# Patient Record
Sex: FEMALE | Race: WHITE | NOT HISPANIC OR LATINO
[De-identification: names, ages, dates, MRNs, and addresses within clinical notes are randomized per-mention and may not be internally consistent; named-entity substitution may affect disease eponyms.]

---

## 2017-12-05 ENCOUNTER — RESULT REVIEW (OUTPATIENT)
Age: 57
End: 2017-12-05

## 2018-07-03 ENCOUNTER — INPATIENT (INPATIENT)
Facility: HOSPITAL | Age: 58
LOS: 1 days | Discharge: HOME | DRG: 313 | End: 2018-07-05
Attending: INTERNAL MEDICINE | Admitting: GENERAL ACUTE CARE HOSPITAL
Payer: MEDICAID

## 2018-07-03 VITALS — HEIGHT: 64 IN | WEIGHT: 141.98 LBS

## 2018-07-03 DIAGNOSIS — Z98.89 OTHER SPECIFIED POSTPROCEDURAL STATES: Chronic | ICD-10-CM

## 2018-07-03 LAB
ALBUMIN SERPL ELPH-MCNC: 4 G/DL — SIGNIFICANT CHANGE UP (ref 3.3–5.2)
ALP SERPL-CCNC: 63 U/L — SIGNIFICANT CHANGE UP (ref 40–120)
ALT FLD-CCNC: 24 U/L — SIGNIFICANT CHANGE UP
ANION GAP SERPL CALC-SCNC: 13 MMOL/L — SIGNIFICANT CHANGE UP (ref 5–17)
APTT BLD: 32 SEC — SIGNIFICANT CHANGE UP (ref 27.5–37.4)
AST SERPL-CCNC: 22 U/L — SIGNIFICANT CHANGE UP
BASOPHILS # BLD AUTO: 0 K/UL — SIGNIFICANT CHANGE UP (ref 0–0.2)
BASOPHILS NFR BLD AUTO: 0.2 % — SIGNIFICANT CHANGE UP (ref 0–2)
BILIRUB SERPL-MCNC: <0.2 MG/DL — LOW (ref 0.4–2)
BUN SERPL-MCNC: 19 MG/DL — SIGNIFICANT CHANGE UP (ref 8–20)
CALCIUM SERPL-MCNC: 9 MG/DL — SIGNIFICANT CHANGE UP (ref 8.6–10.2)
CHLORIDE SERPL-SCNC: 104 MMOL/L — SIGNIFICANT CHANGE UP (ref 98–107)
CK MB CFR SERPL CALC: 2.2 NG/ML — SIGNIFICANT CHANGE UP (ref 0–6.7)
CK SERPL-CCNC: 153 U/L — SIGNIFICANT CHANGE UP (ref 25–170)
CO2 SERPL-SCNC: 25 MMOL/L — SIGNIFICANT CHANGE UP (ref 22–29)
CREAT SERPL-MCNC: 0.95 MG/DL — SIGNIFICANT CHANGE UP (ref 0.5–1.3)
EOSINOPHIL # BLD AUTO: 0.2 K/UL — SIGNIFICANT CHANGE UP (ref 0–0.5)
EOSINOPHIL NFR BLD AUTO: 3.7 % — SIGNIFICANT CHANGE UP (ref 0–6)
GLUCOSE SERPL-MCNC: 96 MG/DL — SIGNIFICANT CHANGE UP (ref 70–115)
HCT VFR BLD CALC: 39.3 % — SIGNIFICANT CHANGE UP (ref 37–47)
HGB BLD-MCNC: 12.8 G/DL — SIGNIFICANT CHANGE UP (ref 12–16)
INR BLD: 0.89 RATIO — SIGNIFICANT CHANGE UP (ref 0.88–1.16)
LYMPHOCYTES # BLD AUTO: 2.4 K/UL — SIGNIFICANT CHANGE UP (ref 1–4.8)
LYMPHOCYTES # BLD AUTO: 37.7 % — SIGNIFICANT CHANGE UP (ref 20–55)
MCHC RBC-ENTMCNC: 29.4 PG — SIGNIFICANT CHANGE UP (ref 27–31)
MCHC RBC-ENTMCNC: 32.6 G/DL — SIGNIFICANT CHANGE UP (ref 32–36)
MCV RBC AUTO: 90.3 FL — SIGNIFICANT CHANGE UP (ref 81–99)
MONOCYTES # BLD AUTO: 0.5 K/UL — SIGNIFICANT CHANGE UP (ref 0–0.8)
MONOCYTES NFR BLD AUTO: 8 % — SIGNIFICANT CHANGE UP (ref 3–10)
NEUTROPHILS # BLD AUTO: 3.3 K/UL — SIGNIFICANT CHANGE UP (ref 1.8–8)
NEUTROPHILS NFR BLD AUTO: 50.2 % — SIGNIFICANT CHANGE UP (ref 37–73)
PLATELET # BLD AUTO: 265 K/UL — SIGNIFICANT CHANGE UP (ref 150–400)
POTASSIUM SERPL-MCNC: 3.7 MMOL/L — SIGNIFICANT CHANGE UP (ref 3.5–5.3)
POTASSIUM SERPL-SCNC: 3.7 MMOL/L — SIGNIFICANT CHANGE UP (ref 3.5–5.3)
PROT SERPL-MCNC: 6.7 G/DL — SIGNIFICANT CHANGE UP (ref 6.6–8.7)
PROTHROM AB SERPL-ACNC: 9.8 SEC — SIGNIFICANT CHANGE UP (ref 9.8–12.7)
RBC # BLD: 4.35 M/UL — LOW (ref 4.4–5.2)
RBC # FLD: 12.2 % — SIGNIFICANT CHANGE UP (ref 11–15.6)
SODIUM SERPL-SCNC: 142 MMOL/L — SIGNIFICANT CHANGE UP (ref 135–145)
TROPONIN T SERPL-MCNC: <0.01 NG/ML — SIGNIFICANT CHANGE UP (ref 0–0.06)
WBC # BLD: 6.5 K/UL — SIGNIFICANT CHANGE UP (ref 4.8–10.8)
WBC # FLD AUTO: 6.5 K/UL — SIGNIFICANT CHANGE UP (ref 4.8–10.8)

## 2018-07-03 PROCEDURE — 99218: CPT

## 2018-07-03 NOTE — ED PROVIDER NOTE - TIMING
sudden onset generalized weakness, fatigue, redness, drainage from surgical sites on your belly, increase pain. Go to the nearest emergency room for chest pain, shortness of breath, calf pain and swelling.

## 2018-07-03 NOTE — ED CDU PROVIDER INITIAL DAY NOTE - OBJECTIVE STATEMENT
56 yo female pmh asthma comes to ed with chest pain which radiated to back  and left arm  2 days ago;

## 2018-07-03 NOTE — ED ADULT NURSE NOTE - CHPI ED SYMPTOMS NEG
no syncope/no diaphoresis/no nausea/no vomiting/no fever/no shortness of breath/no chills/no back pain/no cough

## 2018-07-03 NOTE — ED STATDOCS - PROGRESS NOTE DETAILS
USOH until about two days ago, noted lightheadedness, not vertigo, without slurred speech/focal weakness, seen by her PMD yesterday, referred to Cardiology today due to intermittent chest tightness associated with LUE tingling/"funny sensation", sent to ED by her Cardiologist--Dr. Walton due to abnormal ECG.  Protocol orders entered, patient placed in MAIN AREA of ED on Monitor for a complete evaluation by another provider.

## 2018-07-03 NOTE — ED ADULT TRIAGE NOTE - CHIEF COMPLAINT QUOTE
"I was having chest pain, I had an EKG yesterday and the cardiologist saw it today and told me to get here right away. " Pt denies chest pain but states she feels dizzy and has a weird feeling in her arm. Pt A & OX4.

## 2018-07-03 NOTE — ED ADULT NURSE NOTE - OBJECTIVE STATEMENT
cp few days ago, dizziness since yesterday, pt reports minor dizziness at time of assessment w/o any other complaints. sent in from cardiologist office for abnormal ekg. a and o x3. breathing even and unlabored.  sitting calm in bed. will continue to monitor.

## 2018-07-03 NOTE — ED PROVIDER NOTE - OBJECTIVE STATEMENT
58 yo female pmh asthma comes to ed with chest pain which radiated to back  and left arm  2 days ago;

## 2018-07-04 DIAGNOSIS — R07.9 CHEST PAIN, UNSPECIFIED: ICD-10-CM

## 2018-07-04 LAB
TROPONIN T SERPL-MCNC: <0.01 NG/ML — SIGNIFICANT CHANGE UP (ref 0–0.06)
TROPONIN T SERPL-MCNC: <0.01 NG/ML — SIGNIFICANT CHANGE UP (ref 0–0.06)

## 2018-07-04 PROCEDURE — 99223 1ST HOSP IP/OBS HIGH 75: CPT

## 2018-07-04 PROCEDURE — 93010 ELECTROCARDIOGRAM REPORT: CPT

## 2018-07-04 PROCEDURE — 93880 EXTRACRANIAL BILAT STUDY: CPT | Mod: 26

## 2018-07-04 PROCEDURE — 93010 ELECTROCARDIOGRAM REPORT: CPT | Mod: 77

## 2018-07-04 PROCEDURE — 99217: CPT

## 2018-07-04 RX ORDER — METOPROLOL TARTRATE 50 MG
25 TABLET ORAL
Qty: 0 | Refills: 0 | Status: DISCONTINUED | OUTPATIENT
Start: 2018-07-05 | End: 2018-07-05

## 2018-07-04 RX ORDER — ASPIRIN/CALCIUM CARB/MAGNESIUM 324 MG
81 TABLET ORAL DAILY
Qty: 0 | Refills: 0 | Status: DISCONTINUED | OUTPATIENT
Start: 2018-07-04 | End: 2018-07-05

## 2018-07-04 RX ORDER — LANOLIN ALCOHOL/MO/W.PET/CERES
5 CREAM (GRAM) TOPICAL AT BEDTIME
Qty: 0 | Refills: 0 | Status: DISCONTINUED | OUTPATIENT
Start: 2018-07-04 | End: 2018-07-05

## 2018-07-04 RX ORDER — ALBUTEROL 90 UG/1
2 AEROSOL, METERED ORAL EVERY 6 HOURS
Qty: 0 | Refills: 0 | Status: DISCONTINUED | OUTPATIENT
Start: 2018-07-04 | End: 2018-07-05

## 2018-07-04 RX ORDER — SODIUM CHLORIDE 9 MG/ML
1000 INJECTION INTRAMUSCULAR; INTRAVENOUS; SUBCUTANEOUS
Qty: 0 | Refills: 0 | Status: COMPLETED | OUTPATIENT
Start: 2018-07-04 | End: 2018-07-04

## 2018-07-04 RX ORDER — ENOXAPARIN SODIUM 100 MG/ML
40 INJECTION SUBCUTANEOUS DAILY
Qty: 0 | Refills: 0 | Status: DISCONTINUED | OUTPATIENT
Start: 2018-07-05 | End: 2018-07-05

## 2018-07-04 RX ORDER — CALCIUM CARBONATE 500(1250)
1 TABLET ORAL THREE TIMES A DAY
Qty: 0 | Refills: 0 | Status: DISCONTINUED | OUTPATIENT
Start: 2018-07-04 | End: 2018-07-05

## 2018-07-04 RX ORDER — BUDESONIDE AND FORMOTEROL FUMARATE DIHYDRATE 160; 4.5 UG/1; UG/1
2 AEROSOL RESPIRATORY (INHALATION)
Qty: 0 | Refills: 0 | Status: DISCONTINUED | OUTPATIENT
Start: 2018-07-04 | End: 2018-07-05

## 2018-07-04 RX ADMIN — SODIUM CHLORIDE 75 MILLILITER(S): 9 INJECTION INTRAMUSCULAR; INTRAVENOUS; SUBCUTANEOUS at 16:34

## 2018-07-04 RX ADMIN — Medication 1 TABLET(S): at 23:28

## 2018-07-04 RX ADMIN — Medication 81 MILLIGRAM(S): at 16:34

## 2018-07-04 RX ADMIN — BUDESONIDE AND FORMOTEROL FUMARATE DIHYDRATE 2 PUFF(S): 160; 4.5 AEROSOL RESPIRATORY (INHALATION) at 20:00

## 2018-07-04 NOTE — H&P ADULT - ASSESSMENT
Patient is a 57 year old female with PMH of Vertigo, Asthma, and Insomnia who was sent to the ED from her cardiologist's office due to chest pain and abnormal EKG for a cardiac work up.     1. Chest Pain, rule out ACS  -admit to telemetry   -currently chest pain free  -outpatient EKG with possible anterolateral infarct   -TNI negative x 2  -serial TNI and EKG  -obtain TTE  -check lipid panel and HbA1c for risk stratification  -cardiac CTA in AM  -start ASA 81 mg daily and low dose metoprolol    2. Asthma  -respiratory status stable on RA  -ventolin PRN  -Symbicort BID  -CXR clear    3. Insomnia   -continue melatonin PRN    4. History of Vertigo  -long-standing history with intermittent dizziness which is triggered by fatigue  -tilt table test years ago  -declined meclizine while inpatient  -obtain TTE and carotid duplex    DVT prophylaxis - Lovenox SC

## 2018-07-04 NOTE — H&P ADULT - FAMILY HISTORY
Child  Still living? Unknown  Family history of transposition of great vessels, Age at diagnosis: Age Unknown

## 2018-07-04 NOTE — CONSULT NOTE ADULT - ASSESSMENT
58 y/o F with asthma, sent in by outpatient cardiologist (Dr. Rodriguez) for abnormal ECG and chest pain.    She reports 1 year history of progressive dyspnea, fatigue, decreased exercise tolerance.  Four days ago had acute onset midsternal chest tightness, radiating to the left shoulder, left axilla, associated with left arm tightness/paresthesias, lasted for < 1 minute, occurring at rest.  She was supposed to have a nuclear stress test due to the progressive dyspnea/fatigue, however have not gotten to it due to recurrent bronchitis.  ECG shows poor R wave progression.  Cardiac enzymes are negative.  CXR normal.    # angina - currently chest pain free  # abnormal ECG - possible anteroseptal infarct, no available priors to compare  # asthma - stable, no acute issues    - cardiac CTA  - metoprolol 25 mg  bid  - agree with TTE, lipid panel, hba1c for additional risk stratification  - monitor on telemetry  - Thank you for allowing me to participate in care of your patient.     D/W patient  and Dr. Radha Rodriguez to resume care tomorrow

## 2018-07-04 NOTE — ED ADULT NURSE REASSESSMENT NOTE - NS ED NURSE REASSESS COMMENT FT1
Pt c/o indigestion and requesting to take tums, as per MD West "OK", EKG ordered and to be completed, denies SOB, denies N/V, denies chest pain, showing NSR on monitor, pt awaiting CTA Cardiac in AM, will continue to monitor
Pt reports NPO since 10pm last night but did have a few sips of water this morning approx 0600 today. Pt verbalized understanding of not eating or drinking until after CT. Will continue to monitor.
Report received from outgoing RN's Kylie at change of shift and assumed care of pt at that time. Pt received on stretcher in no acute distress, awake, alert and oriented x 4, no acute distress, vss, fall precautions in place, denies pain, offers no complaints at this time, await MD orders, will continue to monitor.
Assuming care from previous RN, pt A&O x's 4, resp even and unlabored, LS clear and equal B/L, denies chest pain, denies SOB, denies nausea, c/o slight dizziness and weakness, pt aware of CTA and plan of care, placed on monitor showing sinus angel, will continue to monitor
Report received from outgoing RN's Kylie at change of shift and assumed care of pt at that time. Pt received on stretcher in no acute distress, awake, alert and oriented x 4, no acute distress, vss, fall precautions in place, denies pain, offers no complaints at this time, await CT, will continue to monitor.

## 2018-07-04 NOTE — CONSULT NOTE ADULT - SUBJECTIVE AND OBJECTIVE BOX
[facility]                                                        Office: [address] [place] [address]                                                       Telephone: [phone]. Fax:[phone]    Patient is a 57y old  Female who presents with a chief complaint of chest pain and fatigue    HPI: 58 y/o F with asthma, sent in by outpatient cardiologist (Dr. Rodriguez) for abnormal ECG and chest pain.    She reports 1 year history of progressive dyspnea, fatigue, decreased exercise tolerance.  Four days ago had acute onset midsternal chest tightness, radiating to the left shoulder, left axilla, associated with left arm tightness/paresthesias, lasted for < 1 minute, occurring at rest.  She was supposed to have a nuclear stress test due to the progressive dyspnea/fatigue, however have not gotten to it due to recurrent bronchitis.  ECG shows poor R wave progression.  Cardiac enzymes are negative.  Many years ago she reports having a positive tilt table test.       PAST MEDICAL & SURGICAL HISTORY:  asthma  No significant past surgical history    Allergies  No Known Allergies  Intolerances    Home Medications:  Proventil:  (04 Jul 2018 11:34)  symbicort      MEDICATIONS  (STANDING):  sodium chloride 0.9%. 1000 milliLiter(s) (75 mL/Hr) IV Continuous <Continuous>    MEDICATIONS  (PRN):      FAMILY HISTORY: son with TGA, no known premature cad, no sudden cardiac death.     SOCIAL HISTORY: No tobacco/ No etoh/ No illicit drug use    PREVIOUS DIAGNOSTIC TESTING:  NONE IN OUR SYSTEM    REVIEW OF SYSTEMS:  CONSTITUTIONAL: [-]fever   [-] weight loss   [+] fatigue  EYES: [-]  eye pain   [-] visual disturbances      [-]  discharge  ENMT:  [-]  difficulty hearing,   [-]  tinnitus   [-] vertigo    [-]  sinus or throat pain  NECK: [-]  pain or stiffness  RESPIRATORY: [-]  cough 	[-] wheezing 	[-]  hemoptysis 		[+]   Shortness of Breath  CARDIOVASCULAR: [+]  chest pain	[-] palpitations		[-]  passing out 		[-] dizziness 	[-]  leg swelling  		[-]  PND 	[-] orthopnea  GASTROINTESTINAL: [-]  abdominal pain		[-]nausea	[-] vomiting	[-]  hematemesis 	[-]  diarrhea  	[-] constipation 		[-]  melena 	[-] hematochezia.  GENITOURINARY: [-] dysuria	[-] frequency	[-] hematuria	[-]  incontinence  NEUROLOGICAL: [-]  headaches		[-] memory loss 	[-]  loss of strength  			[-]  numbness/tingling 	[-]  tremors  SKIN: [-]  itching 	[-] rashes 	[-]  lesions   LYMPH Nodes: [-] enlarged glands  ENDOCRINE: [-] heat or cold intolerance 	[-]   hair loss  MUSCULOSKELETAL: [-] joint pain  [-] joint swelling	[-]  muscle, back, or extremity pain  PSYCHIATRIC: [-]  depression	[-] anxiety	[-] mood swings		[-]  difficulty sleeping   HEME: [-]  easy bruising 	[-]  bleeding   ALLERY AND IMMUNOLOGIC: [-]  hives or eczema	      Vital Signs Last 24 Hrs  T(C): 36.3 (04 Jul 2018 11:05), Max: 36.8 (03 Jul 2018 14:30)  T(F): 97.4 (04 Jul 2018 11:05), Max: 98.2 (03 Jul 2018 14:30)  HR: 64 (04 Jul 2018 11:05) (58 - 95)  BP: 122/77 (04 Jul 2018 11:05) (122/77 - 141/80)  RR: 20 (04 Jul 2018 11:05) (16 - 20)  SpO2: 97% (04 Jul 2018 11:05) (96% - 100%)  Daily Height in cm: 162.56 (03 Jul 2018 14:15)      PHYSICAL EXAM:  Appearance: Normal, well nourished, NAD	  HEENT:   Normal oral mucosa, PERRL, EOMI, sclera non-icteric	  Lymphatic: No cervical lymphadenopathy  Cardiovascular: Normal S1 S2, No JVD, No cardiac murmurs, No carotid bruits, No peripheral edema  Respiratory: Lungs clear to auscultation	  Psychiatry: A & O x 3, Mood & affect appropriate  Gastrointestinal:  Soft, Non-tender, + BS, no bruits	  Skin: No rashes, No ecchymoses, No cyanosis, Dry  Neurologic: Grossly non-focal with full strength in all four extremities  Extremities: Normal range of motion, No clubbing, cyanosis or edema  Vascular: Peripheral pulses palpable 2+ bilaterally, warm    INTERPRETATION OF TELEMETRY: SR    ECG (tracing reviewed by me): SR, anteroseptal infarct (age indeterminate)    LABS:                        12.8   6.5   )-----------( 265      ( 03 Jul 2018 18:31 )             39.3     07-03    142  |  104  |  19.0  ----------------------------<  96  3.7   |  25.0  |  0.95    Ca    9.0      03 Jul 2018 18:31    TPro  6.7  /  Alb  4.0  /  TBili  <0.2<L>  /  DBili  x   /  AST  22  /  ALT  24  /  AlkPhos  63  07-03    CARDIAC MARKERS ( 04 Jul 2018 04:37 )  x     / <0.01 ng/mL / x     / x     / x      CARDIAC MARKERS ( 03 Jul 2018 18:31 )  x     / <0.01 ng/mL / 153 U/L / x     / 2.2 ng/mL      PT/INR - ( 03 Jul 2018 18:31 )   PT: 9.8 sec;   INR: 0.89 ratio    PTT - ( 03 Jul 2018 18:31 )  PTT:32.0 sec    BNP     RADIOLOGY & ADDITIONAL STUDIES:  CXR (image reviewed by me): < from: Xray Chest 1 View AP/PA. (07.03.18 @ 15:34) >  Findings: The heart is unremarkable. The lungs are clear. Bones are   unremarkable for age.    Impression: Clear lungs.    < end of copied text >

## 2018-07-04 NOTE — H&P ADULT - RS GEN PE MLT RESP DETAILS PC
clear to auscultation bilaterally/normal/airway patent/breath sounds equal/good air movement/respirations non-labored

## 2018-07-05 ENCOUNTER — TRANSCRIPTION ENCOUNTER (OUTPATIENT)
Age: 58
End: 2018-07-05

## 2018-07-05 LAB
ANION GAP SERPL CALC-SCNC: 12 MMOL/L — SIGNIFICANT CHANGE UP (ref 5–17)
BASOPHILS # BLD AUTO: 0 K/UL — SIGNIFICANT CHANGE UP (ref 0–0.2)
BASOPHILS NFR BLD AUTO: 0.2 % — SIGNIFICANT CHANGE UP (ref 0–2)
BUN SERPL-MCNC: 18 MG/DL — SIGNIFICANT CHANGE UP (ref 8–20)
CALCIUM SERPL-MCNC: 9.1 MG/DL — SIGNIFICANT CHANGE UP (ref 8.6–10.2)
CHLORIDE SERPL-SCNC: 106 MMOL/L — SIGNIFICANT CHANGE UP (ref 98–107)
CHOLEST SERPL-MCNC: 183 MG/DL — SIGNIFICANT CHANGE UP (ref 110–199)
CO2 SERPL-SCNC: 25 MMOL/L — SIGNIFICANT CHANGE UP (ref 22–29)
CREAT SERPL-MCNC: 0.83 MG/DL — SIGNIFICANT CHANGE UP (ref 0.5–1.3)
EOSINOPHIL # BLD AUTO: 0.2 K/UL — SIGNIFICANT CHANGE UP (ref 0–0.5)
EOSINOPHIL NFR BLD AUTO: 5.1 % — SIGNIFICANT CHANGE UP (ref 0–6)
GLUCOSE SERPL-MCNC: 82 MG/DL — SIGNIFICANT CHANGE UP (ref 70–115)
HBA1C BLD-MCNC: 5.6 % — SIGNIFICANT CHANGE UP (ref 4–5.6)
HCT VFR BLD CALC: 41.9 % — SIGNIFICANT CHANGE UP (ref 37–47)
HDLC SERPL-MCNC: 62 MG/DL — LOW
HGB BLD-MCNC: 13.5 G/DL — SIGNIFICANT CHANGE UP (ref 12–16)
LIPID PNL WITH DIRECT LDL SERPL: 109 MG/DL — SIGNIFICANT CHANGE UP
LYMPHOCYTES # BLD AUTO: 2.1 K/UL — SIGNIFICANT CHANGE UP (ref 1–4.8)
LYMPHOCYTES # BLD AUTO: 43.6 % — SIGNIFICANT CHANGE UP (ref 20–55)
MAGNESIUM SERPL-MCNC: 2 MG/DL — SIGNIFICANT CHANGE UP (ref 1.6–2.6)
MCHC RBC-ENTMCNC: 29 PG — SIGNIFICANT CHANGE UP (ref 27–31)
MCHC RBC-ENTMCNC: 32.2 G/DL — SIGNIFICANT CHANGE UP (ref 32–36)
MCV RBC AUTO: 89.9 FL — SIGNIFICANT CHANGE UP (ref 81–99)
MONOCYTES # BLD AUTO: 0.5 K/UL — SIGNIFICANT CHANGE UP (ref 0–0.8)
MONOCYTES NFR BLD AUTO: 10.4 % — HIGH (ref 3–10)
NEUTROPHILS # BLD AUTO: 1.9 K/UL — SIGNIFICANT CHANGE UP (ref 1.8–8)
NEUTROPHILS NFR BLD AUTO: 40.5 % — SIGNIFICANT CHANGE UP (ref 37–73)
PHOSPHATE SERPL-MCNC: 3.5 MG/DL — SIGNIFICANT CHANGE UP (ref 2.4–4.7)
PLATELET # BLD AUTO: 237 K/UL — SIGNIFICANT CHANGE UP (ref 150–400)
POTASSIUM SERPL-MCNC: 4.4 MMOL/L — SIGNIFICANT CHANGE UP (ref 3.5–5.3)
POTASSIUM SERPL-SCNC: 4.4 MMOL/L — SIGNIFICANT CHANGE UP (ref 3.5–5.3)
RBC # BLD: 4.66 M/UL — SIGNIFICANT CHANGE UP (ref 4.4–5.2)
RBC # FLD: 12.3 % — SIGNIFICANT CHANGE UP (ref 11–15.6)
SODIUM SERPL-SCNC: 143 MMOL/L — SIGNIFICANT CHANGE UP (ref 135–145)
TOTAL CHOLESTEROL/HDL RATIO MEASUREMENT: 3 RATIO — LOW (ref 3.3–7.1)
TRIGL SERPL-MCNC: 59 MG/DL — SIGNIFICANT CHANGE UP (ref 10–200)
TROPONIN T SERPL-MCNC: <0.01 NG/ML — SIGNIFICANT CHANGE UP (ref 0–0.06)
WBC # BLD: 4.7 K/UL — LOW (ref 4.8–10.8)
WBC # FLD AUTO: 4.7 K/UL — LOW (ref 4.8–10.8)

## 2018-07-05 PROCEDURE — 80061 LIPID PANEL: CPT

## 2018-07-05 PROCEDURE — 83735 ASSAY OF MAGNESIUM: CPT

## 2018-07-05 PROCEDURE — 84484 ASSAY OF TROPONIN QUANT: CPT

## 2018-07-05 PROCEDURE — 36415 COLL VENOUS BLD VENIPUNCTURE: CPT

## 2018-07-05 PROCEDURE — 82553 CREATINE MB FRACTION: CPT

## 2018-07-05 PROCEDURE — 75574 CT ANGIO HRT W/3D IMAGE: CPT | Mod: 26

## 2018-07-05 PROCEDURE — 71045 X-RAY EXAM CHEST 1 VIEW: CPT

## 2018-07-05 PROCEDURE — 93880 EXTRACRANIAL BILAT STUDY: CPT

## 2018-07-05 PROCEDURE — 99285 EMERGENCY DEPT VISIT HI MDM: CPT | Mod: 25

## 2018-07-05 PROCEDURE — 94640 AIRWAY INHALATION TREATMENT: CPT

## 2018-07-05 PROCEDURE — G0378: CPT

## 2018-07-05 PROCEDURE — 85027 COMPLETE CBC AUTOMATED: CPT

## 2018-07-05 PROCEDURE — 80053 COMPREHEN METABOLIC PANEL: CPT

## 2018-07-05 PROCEDURE — 99233 SBSQ HOSP IP/OBS HIGH 50: CPT

## 2018-07-05 PROCEDURE — 85610 PROTHROMBIN TIME: CPT

## 2018-07-05 PROCEDURE — 83036 HEMOGLOBIN GLYCOSYLATED A1C: CPT

## 2018-07-05 PROCEDURE — 93306 TTE W/DOPPLER COMPLETE: CPT

## 2018-07-05 PROCEDURE — 93005 ELECTROCARDIOGRAM TRACING: CPT

## 2018-07-05 PROCEDURE — 84100 ASSAY OF PHOSPHORUS: CPT

## 2018-07-05 PROCEDURE — 82550 ASSAY OF CK (CPK): CPT

## 2018-07-05 PROCEDURE — 85730 THROMBOPLASTIN TIME PARTIAL: CPT

## 2018-07-05 PROCEDURE — 80048 BASIC METABOLIC PNL TOTAL CA: CPT

## 2018-07-05 PROCEDURE — 75574 CT ANGIO HRT W/3D IMAGE: CPT

## 2018-07-05 PROCEDURE — 93306 TTE W/DOPPLER COMPLETE: CPT | Mod: 26

## 2018-07-05 RX ORDER — ALBUTEROL 90 UG/1
0 AEROSOL, METERED ORAL
Qty: 0 | Refills: 0 | COMMUNITY

## 2018-07-05 RX ORDER — METOPROLOL TARTRATE 50 MG
1 TABLET ORAL
Qty: 0 | Refills: 0 | COMMUNITY
Start: 2018-07-05

## 2018-07-05 RX ORDER — ASPIRIN/CALCIUM CARB/MAGNESIUM 324 MG
1 TABLET ORAL
Qty: 0 | Refills: 0 | COMMUNITY
Start: 2018-07-05

## 2018-07-05 RX ORDER — BUDESONIDE AND FORMOTEROL FUMARATE DIHYDRATE 160; 4.5 UG/1; UG/1
2 AEROSOL RESPIRATORY (INHALATION)
Qty: 0 | Refills: 0 | COMMUNITY

## 2018-07-05 RX ADMIN — Medication 1 TABLET(S): at 13:33

## 2018-07-05 RX ADMIN — Medication 25 MILLIGRAM(S): at 06:11

## 2018-07-05 RX ADMIN — Medication 1 TABLET(S): at 21:17

## 2018-07-05 RX ADMIN — BUDESONIDE AND FORMOTEROL FUMARATE DIHYDRATE 2 PUFF(S): 160; 4.5 AEROSOL RESPIRATORY (INHALATION) at 09:05

## 2018-07-05 RX ADMIN — Medication 81 MILLIGRAM(S): at 13:32

## 2018-07-05 RX ADMIN — BUDESONIDE AND FORMOTEROL FUMARATE DIHYDRATE 2 PUFF(S): 160; 4.5 AEROSOL RESPIRATORY (INHALATION) at 21:07

## 2018-07-05 RX ADMIN — Medication 5 MILLIGRAM(S): at 21:26

## 2018-07-05 RX ADMIN — Medication 1 TABLET(S): at 06:10

## 2018-07-05 RX ADMIN — ENOXAPARIN SODIUM 40 MILLIGRAM(S): 100 INJECTION SUBCUTANEOUS at 13:34

## 2018-07-05 NOTE — DISCHARGE NOTE ADULT - HOSPITAL COURSE
Patient is a 57 year old female with PMH of Vertigo, Asthma, and Insomnia who was sent to the ED from her cardiologist's office due to chest pain and abnormal EKG for a Chest Pain, rule out ACS  -currently chest pain free  -outpatient EKG with possible anterolateral infarct   -TNI negative x 3, acs ruled out  - US carotids unremarkable, CXR neg  - TTE, + Cardiac CTA performed inpatient prior to clearance for discharge.   -lipid panel + HbA1c done for risk stratification, ascvd 2.6%, both unremarkable  -ASA 81 mg daily and low dose metoprolol started inpt per cardio  - continued outpatient cardio follow up advised.     Regarding Asthma, Mild Persistent  -respiratory status stable on RA, w/o exac  -ventolin PRN  -Symbicort BID  -CXR clear    Regarding Insomnia   -continue melatonin PRN    Regarding History of Vertigo  -long-standing history with intermittent dizziness which is triggered by fatigue  -tilt table test years ago  -declined meclizine while inpatient  - carotid US unremarkable  - outpt vestibular therapy    All electrolyte abnormalities were monitored carefully and repleted as necessary during this hospitalization. At the time of discharge patient was hemodynamically stable and amenable to all terms of discharge. The patient has received verbal instructions from myself regarding discharge plans.     Length of Discharge: 45MIN Patient is a 57 year old female with PMH of Vertigo, Asthma, and Insomnia who was sent to the ED from her cardiologist's office due to chest pain and abnormal EKG for a Chest Pain, rule out ACS  -currently chest pain free  -outpatient EKG with possible anterolateral infarct   -TNI negative x 3, acs ruled out  - US carotids unremarkable, CXR neg  - TTE, + Cardiac CTA performed inpatient prior to clearance for discharge.   -lipid panel + HbA1c done for risk stratification, ascvd 2.6%, both unremarkable  -ASA 81 mg daily and low dose metoprolol started inpt per cardio  - continued outpatient cardio follow up advised.     Regarding Asthma, Mild Persistent  -respiratory status stable on RA, w/o exac  -ventolin PRN  -Symbicort BID  -CXR clear    Regarding Insomnia   -continue melatonin PRN    Regarding History of Vertigo  -long-standing history with intermittent dizziness which is triggered by fatigue  -tilt table test years ago  -declined meclizine while inpatient  - carotid US unremarkable  - outpt vestibular therapy    Vital Signs Last 24 Hrs  T(C): 37.1 (06 Jul 2018 11:05), Max: 37.1 (05 Jul 2018 19:01)  T(F): 98.7 (06 Jul 2018 11:05), Max: 98.7 (05 Jul 2018 19:01)  HR: 84 (06 Jul 2018 11:05) (52 - 101)  BP: 119/64 (06 Jul 2018 11:05) (111/64 - 125/62)  BP(mean): --  RR: 18 (06 Jul 2018 11:05) (18 - 18)  SpO2: 96% (06 Jul 2018 08:20) (94% - 97%)    PHYSICAL EXAM.    GEN - appears age appropriate. well nourished. pleasant. no distress.   HEENT - NCAT, EOMI, KENIA  Resp - not on supplemental O2.  CARDIO - NS1S2, RRR. No murmurs/rubs/gallops.  Ext - No TACHO.  MSK - BL 5/5 strength on upper and lower extremities.   Neuro - AAOx3.     All electrolyte abnormalities were monitored carefully and repleted as necessary during this hospitalization. At the time of discharge patient was hemodynamically stable and amenable to all terms of discharge. The patient has received verbal instructions from myself regarding discharge plans.     Length of Discharge: 45MIN

## 2018-07-05 NOTE — DISCHARGE NOTE ADULT - PATIENT PORTAL LINK FT
You can access the [facility] Patient Portal, offered by [facility], by registering with the following website: [email or web address]

## 2018-07-05 NOTE — PROGRESS NOTE ADULT - ASSESSMENT
58 y/o F with asthma, sent in by outpatient cardiologist (Dr. Rodriguez) for abnormal ECG and chest pain.    She reports 1 year history of progressive dyspnea, fatigue, decreased exercise tolerance.  Four days ago had acute onset midsternal chest tightness, radiating to the left shoulder, left axilla, associated with left arm tightness/paresthesias, lasted for < 1 minute, occurring at rest.  She was supposed to have a nuclear stress test due to the progressive dyspnea/fatigue, however have not gotten to it due to recurrent bronchitis.  ECG shows poor R wave progression.  Cardiac enzymes are negative.  CXR normal.    CP  Abnl EKG  CE neg  For CTA of coronaries DC planning if unrevealing

## 2018-07-05 NOTE — DISCHARGE NOTE ADULT - MEDICATION SUMMARY - MEDICATIONS TO TAKE
I will START or STAY ON the medications listed below when I get home from the hospital:     mg oral tablet  -- 1 tab(s) by mouth every 8 hours, As Needed WITH FOOD  -- Do not take this drug if you are pregnant.  It is very important that you take or use this exactly as directed.  Do not skip doses or discontinue unless directed by your doctor.  May cause drowsiness or dizziness.  Obtain medical advice before taking any non-prescription drugs as some may affect the action of this medication.  Take with food or milk.      -- Indication: For pain    aspirin 81 mg oral tablet, chewable  -- 1 tab(s) by mouth once a day  -- Indication: For preventative health    Tums 500 mg oral tablet, chewable  -- 1 tab(s) by mouth 3 times a day  -- Indication: For Abdominal pain    metoprolol tartrate 25 mg oral tablet  -- 1 tab(s) by mouth 2 times a day  -- Indication: For heart health    Proventil  -- 2 puff(s) inhaled every 4 hours, As Needed sob/wheezing  -- Indication: For Asthma    Symbicort 80 mcg-4.5 mcg/inh inhalation aerosol  -- 2 puff(s) inhaled 2 times a day  -- Indication: For Asthma    Melatonin 5 mg oral tablet  -- 1 tab(s) by mouth once a day (at bedtime)  -- Indication: For Insomnia

## 2018-07-05 NOTE — PROGRESS NOTE ADULT - SUBJECTIVE AND OBJECTIVE BOX
PMD: unknown    CHIEF COMPLAINT: cp, dizziness    Patient seen and examined at the bedside. No acute overnight events. - yesterday. Complaining of - this AM. Denies fever/chills, headache, lightheadedness, dizziness, chest pain, palpitations, shortness of breath, cough, abd pain, nausea/vomiting/diarrhea, muscle pain.      =========================================================================================  T(C): 36.6 (07-05-18 @ 08:27), Max: 36.7 (07-05-18 @ 05:47)  HR: 54 (07-05-18 @ 08:27) (54 - 74)  BP: 123/66 (07-05-18 @ 08:27) (113/58 - 130/77)  RR: 18 (07-05-18 @ 08:27) (18 - 20)  SpO2: 95% (07-05-18 @ 08:27) (93% - 97%)    PHYSICAL EXAM.    GEN - appears age appropriate. well nourished. pleasant. no distress.   HEENT - NCAT, EOMI, KENIA  RESP - CTA BL, no wheeze/stridor/rhonchi/crackles. not on supplemental O2. able to speak in full sentences without distress.   CARDIO - NS1S2, RRR. No murmurs/rubs/gallops.  ABD - Soft/Non tender/Non distended. Normal BS x4 quadrants. no guarding/rebound tenderness.  Ext - No TACHO.  MSK - BL 5/5 strength on upper and lower extremities.   Neuro - AAOx3. cn 2-12 grossly intact  Psych - normal affect  Skin - c/d/i. no rashes/lesions      I&O's Summary    Daily     Daily     =========================================================================================  LABS.    07-03    142  |  104  |  19.0  ----------------------------<  96  3.7   |  25.0  |  0.95    Ca    9.0      03 Jul 2018 18:31    TPro  6.7  /  Alb  4.0  /  TBili  <0.2<L>  /  DBili  x   /  AST  22  /  ALT  24  /  AlkPhos  63  07-03                          12.8   6.5   )-----------( 265      ( 03 Jul 2018 18:31 )             39.3     LIVER FUNCTIONS - ( 03 Jul 2018 18:31 )  Alb: 4.0 g/dL / Pro: 6.7 g/dL / ALK PHOS: 63 U/L / ALT: 24 U/L / AST: 22 U/L / GGT: x           PT/INR - ( 03 Jul 2018 18:31 )   PT: 9.8 sec;   INR: 0.89 ratio         PTT - ( 03 Jul 2018 18:31 )  PTT:32.0 sec  CARDIAC MARKERS ( 04 Jul 2018 20:13 )  x     / <0.01 ng/mL / x     / x     / x      CARDIAC MARKERS ( 04 Jul 2018 04:37 )  x     / <0.01 ng/mL / x     / x     / x      CARDIAC MARKERS ( 03 Jul 2018 18:31 )  x     / <0.01 ng/mL / 153 U/L / x     / 2.2 ng/mL            =========================================================================================  IMAGING.     =========================================================================================    MEDICATIONS  (STANDING):  aspirin  chewable 81 milliGRAM(s) Oral daily  buDESOnide  80 MICROgram(s)/formoterol 4.5 MICROgram(s) Inhaler 2 Puff(s) Inhalation two times a day  calcium carbonate 500 mG (Tums) Chewable 1 Tablet(s) Chew three times a day  enoxaparin Injectable 40 milliGRAM(s) SubCutaneous daily  metoprolol tartrate 25 milliGRAM(s) Oral two times a day    MEDICATIONS  (PRN):  ALBUTerol    90 MICROgram(s) HFA Inhaler 2 Puff(s) Inhalation every 6 hours PRN Shortness of Breath and/or Wheezing  melatonin 5 milliGRAM(s) Oral at bedtime PRN Insomnia PMD: unknown    CHIEF COMPLAINT: cp, dizziness    Patient seen and examined at the bedside. No acute overnight events. No events yesterday. Complaining of some mild intermittent ep of cp this AM. Denies fever/chills, headache, lightheadedness, dizziness, palpitations, shortness of breath, cough, abd pain, nausea/vomiting/diarrhea, muscle pain.      =========================================================================================  T(C): 36.6 (07-05-18 @ 08:27), Max: 36.7 (07-05-18 @ 05:47)  HR: 54 (07-05-18 @ 08:27) (54 - 74)  BP: 123/66 (07-05-18 @ 08:27) (113/58 - 130/77)  RR: 18 (07-05-18 @ 08:27) (18 - 20)  SpO2: 95% (07-05-18 @ 08:27) (93% - 97%)    PHYSICAL EXAM.    GEN - appears age appropriate. well nourished. pleasant. no distress.   HEENT - NCAT, EOMI, KENIA  RESP - CTA BL, no wheeze/stridor/rhonchi/crackles. not on supplemental O2. able to speak in full sentences without distress.   CARDIO - NS1S2, RRR. No murmurs/rubs/gallops.  ABD - Soft/Non tender/Non distended. Normal BS x4 quadrants. no guarding/rebound tenderness.  Ext - No TACHO.  MSK - BL 5/5 strength on upper and lower extremities.   Neuro - AAOx3. cn 2-12 grossly intact  Psych - normal affect  Skin - c/d/i. no rashes/lesions      I&O's Summary    Daily     Daily     =========================================================================================  LABS.    07-03    142  |  104  |  19.0  ----------------------------<  96  3.7   |  25.0  |  0.95    Ca    9.0      03 Jul 2018 18:31    TPro  6.7  /  Alb  4.0  /  TBili  <0.2<L>  /  DBili  x   /  AST  22  /  ALT  24  /  AlkPhos  63  07-03                          12.8   6.5   )-----------( 265      ( 03 Jul 2018 18:31 )             39.3     LIVER FUNCTIONS - ( 03 Jul 2018 18:31 )  Alb: 4.0 g/dL / Pro: 6.7 g/dL / ALK PHOS: 63 U/L / ALT: 24 U/L / AST: 22 U/L / GGT: x           PT/INR - ( 03 Jul 2018 18:31 )   PT: 9.8 sec;   INR: 0.89 ratio         PTT - ( 03 Jul 2018 18:31 )  PTT:32.0 sec  CARDIAC MARKERS ( 04 Jul 2018 20:13 )  x     / <0.01 ng/mL / x     / x     / x      CARDIAC MARKERS ( 04 Jul 2018 04:37 )  x     / <0.01 ng/mL / x     / x     / x      CARDIAC MARKERS ( 03 Jul 2018 18:31 )  x     / <0.01 ng/mL / 153 U/L / x     / 2.2 ng/mL            =========================================================================================  IMAGING.     =========================================================================================    MEDICATIONS  (STANDING):  aspirin  chewable 81 milliGRAM(s) Oral daily  buDESOnide  80 MICROgram(s)/formoterol 4.5 MICROgram(s) Inhaler 2 Puff(s) Inhalation two times a day  calcium carbonate 500 mG (Tums) Chewable 1 Tablet(s) Chew three times a day  enoxaparin Injectable 40 milliGRAM(s) SubCutaneous daily  metoprolol tartrate 25 milliGRAM(s) Oral two times a day    MEDICATIONS  (PRN):  ALBUTerol    90 MICROgram(s) HFA Inhaler 2 Puff(s) Inhalation every 6 hours PRN Shortness of Breath and/or Wheezing  melatonin 5 milliGRAM(s) Oral at bedtime PRN Insomnia

## 2018-07-05 NOTE — DISCHARGE NOTE ADULT - CARE PROVIDER_API CALL
Primary Care Doctor,   Phone: (   )    -  Fax: (   )    -    Kody Wagner), Cardiovascular Disease  [address] [place] [address]  Phone: [phone]  Fax: [phone]

## 2018-07-05 NOTE — DISCHARGE NOTE ADULT - ADDITIONAL INSTRUCTIONS
-Follow up with Primary Care Doctor within 1 week  -Follow up with Cardiology (heart doctor) in 3-4 weeks

## 2018-07-05 NOTE — PROGRESS NOTE ADULT - ASSESSMENT
Patient is a 57 year old female with PMH of Vertigo, Asthma, and Insomnia who was sent to the ED from her cardiologist's office due to chest pain and abnormal EKG for a cardiac work up.     1. Chest Pain, rule out ACS  -currently chest pain free  -outpatient EKG with possible anterolateral infarct   -TNI negative x 3, acs ruled out  - US carotids unremarkable, CXR neg  -pending TTE, Cardiac CTA  -pending lipid panel and HbA1c for risk stratification  -ASA 81 mg daily and low dose metoprolol started inpt per cardio  -cont tele pending results of CTA + Echo    2. Asthma, Mild Persistent  -respiratory status stable on RA, w/o exac  -ventolin PRN  -Symbicort BID  -CXR clear    3. Insomnia   -continue melatonin PRN    4. History of Vertigo  -long-standing history with intermittent dizziness which is triggered by fatigue  -tilt table test years ago  -declined meclizine while inpatient  - carotid US unremarkable  - outpt vestibular therapy    DVT prophylaxis - Lovenox SC Patient is a 57 year old female with PMH of Vertigo, Asthma, and Insomnia who was sent to the ED from her cardiologist's office due to chest pain and abnormal EKG for a cardiac work up.     Chest Pain, rule out ACS  -currently chest pain free  -outpatient EKG with possible anterolateral infarct   -TNI negative x 3, acs ruled out  - US carotids unremarkable, CXR neg  -pending TTE, Cardiac CTA  -lipid panel + HbA1c done for risk stratification, ascvd 2.6%, both unremarkable  -ASA 81 mg daily and low dose metoprolol started inpt per cardio  -cont tele pending results of CTA + Echo  - dc home if testing unremarkable. discussed with pt who is in agreeance    Asthma, Mild Persistent  -respiratory status stable on RA, w/o exac  -ventolin PRN  -Symbicort BID  -CXR clear    Insomnia   -continue melatonin PRN    History of Vertigo  -long-standing history with intermittent dizziness which is triggered by fatigue  -tilt table test years ago  -declined meclizine while inpatient  - carotid US unremarkable  - outpt vestibular therapy    DVT prophylaxis - Lovenox SC

## 2018-07-05 NOTE — DISCHARGE NOTE ADULT - PLAN OF CARE
prevention You were noted to have chest pain either on arrival or during your hospitalization. Tests to evaluate your heart including blood tests called troponins and EKGs were performed and fortunately you do NOT have signs of heart attack based on these studies. If you have however been instructed to follow up with a Cardiologist for further work up or continued management, it is extremely important that you do so in order to lower your risk of having a heart attack in the future. If you have been prescribed medications for heart health, it is very important that you ALWAYS take them and do not stop doing so unless instructed by a healthcare provider. control Be sure to take all asthma medications as prescribed, it is important that you are consistent and do not miss taking the ones that are meant to be taken daily, even if your breathing already feels well. If you have been prescribed steroids, also be sure to take those are prescribed. Be sure to follow up with your Primary Care Doctor or a Pulmonologist if you have one. If you do not already have an asthma action plan, please discuss establishing one with your outpatient Doctors. Be sure to practice god sleep hygiene practices: put any electronic devices away long before bedtime, do no watch television right before bed. Do not use your bed for anything other than sleeping. Try to wind down in the evening and prepare your room but turning lights down and having your area soothing and calm. If this does not work, you can discuss other methods to calm your insomnia with your Primary Care Doctor. You have a history of vertigo. You may benefit from going to vestibular therapy. Please discuss this with your primary care doctor if desired in order to get a referral.

## 2018-07-05 NOTE — DISCHARGE NOTE ADULT - OTHER SIGNIFICANT FINDINGS
07-05    143  |  106  |  18.0  ----------------------------<  82  4.4   |  25.0  |  0.83    Ca    9.1      05 Jul 2018 10:07  Phos  3.5     07-05  Mg     2.0     07-05    TPro  6.7  /  Alb  4.0  /  TBili  <0.2<L>  /  DBili  x   /  AST  22  /  ALT  24  /  AlkPhos  63  07-03                          13.5   4.7   )-----------( 237      ( 05 Jul 2018 10:07 )             41.9     LIVER FUNCTIONS - ( 03 Jul 2018 18:31 )  Alb: 4.0 g/dL / Pro: 6.7 g/dL / ALK PHOS: 63 U/L / ALT: 24 U/L / AST: 22 U/L / GGT: x           PT/INR - ( 03 Jul 2018 18:31 )   PT: 9.8 sec;   INR: 0.89 ratio         PTT - ( 03 Jul 2018 18:31 )  PTT:32.0 sec    Lipid Profile (07.05.18 @ 10:07)    Total Cholesterol/HDL Ratio Measurement: 3.0 Ratio    Cholesterol, Serum: 183 mg/dL    Triglycerides, Serum: 59 mg/dL    HDL Cholesterol, Serum: 62 mg/dL    Direct LDL: 109:   Hemoglobin A1C, Whole Blood: 5.6 % (07.05.18 @ 10:07)    Troponin T, Serum (07.05.18 @ 10:07)    Troponin T, Serum: <0.01:     < from: US Duplex Carotid Arteries Complete, Bilateral (07.04.18 @ 21:06) >  Impression:  No evidence of hemodynamically significant carotid stenosis..    < from: 12 Lead ECG (07.04.18 @ 15:27) >  Ventricular Rate 58 BPM    Atrial Rate 58 BPM    P-R Interval 182 ms    QRS Duration 84 ms    Q-T Interval 440 ms    QTC Calculation(Bezet) 431 ms    P Axis 58 degrees    R Axis 26 degrees    T Axis 52 degrees    Diagnosis Line Sinus bradycardia  Otherwise normal ECG    < end of copied text > 07-05    143  |  106  |  18.0  ----------------------------<  82  4.4   |  25.0  |  0.83    Ca    9.1      05 Jul 2018 10:07  Phos  3.5     07-05  Mg     2.0     07-05    TPro  6.7  /  Alb  4.0  /  TBili  <0.2<L>  /  DBili  x   /  AST  22  /  ALT  24  /  AlkPhos  63  07-03                          13.5   4.7   )-----------( 237      ( 05 Jul 2018 10:07 )             41.9     LIVER FUNCTIONS - ( 03 Jul 2018 18:31 )  Alb: 4.0 g/dL / Pro: 6.7 g/dL / ALK PHOS: 63 U/L / ALT: 24 U/L / AST: 22 U/L / GGT: x           PT/INR - ( 03 Jul 2018 18:31 )   PT: 9.8 sec;   INR: 0.89 ratio         PTT - ( 03 Jul 2018 18:31 )  PTT:32.0 sec    Lipid Profile (07.05.18 @ 10:07)    Total Cholesterol/HDL Ratio Measurement: 3.0 Ratio    Cholesterol, Serum: 183 mg/dL    Triglycerides, Serum: 59 mg/dL    HDL Cholesterol, Serum: 62 mg/dL    Direct LDL: 109:   Hemoglobin A1C, Whole Blood: 5.6 % (07.05.18 @ 10:07)    Troponin T, Serum (07.05.18 @ 10:07)    Troponin T, Serum: <0.01:     < from: US Duplex Carotid Arteries Complete, Bilateral (07.04.18 @ 21:06) >  Impression:  No evidence of hemodynamically significant carotid stenosis..    < from: 12 Lead ECG (07.04.18 @ 15:27) >  Ventricular Rate 58 BPM    Atrial Rate 58 BPM    P-R Interval 182 ms    QRS Duration 84 ms    Q-T Interval 440 ms    QTC Calculation(Bezet) 431 ms    P Axis 58 degrees    R Axis 26 degrees    T Axis 52 degrees    Diagnosis Line Sinus bradycardia  Otherwise normal ECG    < end of copied text >      < from: TTE Echo Complete w/Doppler (07.05.18 @ 16:34) >  Summary:   1. Normal left ventricular size and wall thicknesses, with normal   systolic function.   2. Left ventricular ejection fraction, by visual estimation, is 55 to   60%.   3. Normal right ventricular size and function.   4. The left atrium is normal in size.   5. The right atrium is normal in size.   6. Normal trileaflet aortic valve with normal opening.   7. Mild mitral valve regurgitation.   8. Mild tricuspid regurgitation.   9. Trace pulmonic valve regurgitation.  10. There is no evidence of pericardial effusion.    < end of copied text >    < from: CT Angio Cardiac w/ IV Cont (07.05.18 @ 13:12) >  IMPRESSION:   Coronary artery calcium score:0. <25TH percentile. ] No identifiable   coronary calcification.  The coronary arteries.  Normal LV size and function.    < end of copied text >

## 2018-07-05 NOTE — PROGRESS NOTE ADULT - SUBJECTIVE AND OBJECTIVE BOX
CARDIOLOGY PROGRESS NOTE   ([facility])                                                                                                        Subjective:   denied CP, dyspnea, Comfortable    Vitals:  T(C): 36.6 (07-05-18 @ 08:27), Max: 36.7 (07-05-18 @ 05:47)  HR: 54 (07-05-18 @ 08:27) (54 - 74)  BP: 123/66 (07-05-18 @ 08:27) (113/58 - 130/77)  RR: 18 (07-05-18 @ 08:27) (18 - 20)  SpO2: 95% (07-05-18 @ 08:27) (93% - 97%)  Wt(kg): --  I&O's Summary        PHYSICAL EXAM:  Appearance: Normal	  HEENT:   Atraumatic  Cardiovascular: Normal S1 S2, No JVD, No murmurs, No edema  Respiratory: Lungs clear to auscultation	  Gastrointestinal:  Soft, Non-tender, + BS	  Skin: No rashes, No ecchymoses, No cyanosis  Neurologic: Alert and awake, able to move extremities  Extremities: No edema    TELEMETRY: 	    	      CURRENT MEDICATIONS:  metoprolol tartrate 25 milliGRAM(s) Oral two times a day      ALBUTerol    90 MICROgram(s) HFA Inhaler 2 Puff(s) Inhalation every 6 hours PRN  buDESOnide  80 MICROgram(s)/formoterol 4.5 MICROgram(s) Inhaler 2 Puff(s) Inhalation two times a day    melatonin 5 milliGRAM(s) Oral at bedtime PRN    calcium carbonate 500 mG (Tums) Chewable 1 Tablet(s) Chew three times a day      aspirin  chewable 81 milliGRAM(s) Oral daily  enoxaparin Injectable 40 milliGRAM(s) SubCutaneous daily      LABS:	 	                          13.5   4.7   )-----------( 237      ( 05 Jul 2018 10:07 )             41.9     07-03    142  |  104  |  19.0  ----------------------------<  96  3.7   |  25.0  |  0.95    Ca    9.0      03 Jul 2018 18:31    TPro  6.7  /  Alb  4.0  /  TBili  <0.2<L>  /  DBili  x   /  AST  22  /  ALT  24  /  AlkPhos  63  07-03    HgA1c: Hemoglobin A1C, Whole Blood: 5.6 % (07-05 @ 10:07)    TSH:

## 2018-07-05 NOTE — DISCHARGE NOTE ADULT - CARE PLAN
Principal Discharge DX:	Chest pain, unspecified type  Goal:	prevention  Assessment and plan of treatment:	You were noted to have chest pain either on arrival or during your hospitalization. Tests to evaluate your heart including blood tests called troponins and EKGs were performed and fortunately you do NOT have signs of heart attack based on these studies. If you have however been instructed to follow up with a Cardiologist for further work up or continued management, it is extremely important that you do so in order to lower your risk of having a heart attack in the future. If you have been prescribed medications for heart health, it is very important that you ALWAYS take them and do not stop doing so unless instructed by a healthcare provider.  Secondary Diagnosis:	Mild persistent asthma without complication  Goal:	control  Assessment and plan of treatment:	Be sure to take all asthma medications as prescribed, it is important that you are consistent and do not miss taking the ones that are meant to be taken daily, even if your breathing already feels well. If you have been prescribed steroids, also be sure to take those are prescribed. Be sure to follow up with your Primary Care Doctor or a Pulmonologist if you have one. If you do not already have an asthma action plan, please discuss establishing one with your outpatient Doctors.  Secondary Diagnosis:	Insomnia, unspecified type  Goal:	control  Assessment and plan of treatment:	Be sure to practice god sleep hygiene practices: put any electronic devices away long before bedtime, do no watch television right before bed. Do not use your bed for anything other than sleeping. Try to wind down in the evening and prepare your room but turning lights down and having your area soothing and calm. If this does not work, you can discuss other methods to calm your insomnia with your Primary Care Doctor.  Secondary Diagnosis:	Vertigo  Goal:	control  Assessment and plan of treatment:	You have a history of vertigo. You may benefit from going to vestibular therapy. Please discuss this with your primary care doctor if desired in order to get a referral.

## 2018-07-06 VITALS
TEMPERATURE: 99 F | HEART RATE: 84 BPM | SYSTOLIC BLOOD PRESSURE: 119 MMHG | DIASTOLIC BLOOD PRESSURE: 64 MMHG | RESPIRATION RATE: 18 BRPM

## 2018-07-06 NOTE — CHART NOTE - NSCHARTNOTEFT_GEN_A_CORE
Patient seen and examined @ the bedside today. Unremarkable echo. Neg CTA Cardiac test result. Patient clinically stable at this time. No longer requires acute in hospital level of care. Can be continually followed/managed as an outpatient. Please see discharge summary for further information including today's vitals and physical exam.

## 2018-07-06 NOTE — PROGRESS NOTE ADULT - SUBJECTIVE AND OBJECTIVE BOX
CARDIOLOGY PROGRESS NOTE   ([facility])                                                                                                        Subjective:     comfortable  NAD  Denied CP    Vitals:  T(C): 36.8 (07-06-18 @ 08:20), Max: 37.1 (07-05-18 @ 19:01)  HR: 60 (07-06-18 @ 08:20) (52 - 101)  BP: 111/64 (07-06-18 @ 08:20) (111/64 - 125/62)  RR: 18 (07-06-18 @ 08:20) (18 - 18)  SpO2: 96% (07-06-18 @ 08:20) (94% - 97%)  Wt(kg): --  I&O's Summary        PHYSICAL EXAM:  Appearance: Normal	  HEENT:   Atraumatic  Cardiovascular: Normal S1 S2, No JVD, No murmurs, No edema  Respiratory: Lungs clear to auscultation	  Gastrointestinal:  Soft, Non-tender, + BS	  Skin: No rashes, No ecchymoses, No cyanosis  Neurologic: Alert and awake, able to move extremities  Extremities: No edema    TELEMETRY: 	    		            LABS:	 	                          13.5   4.7   )-----------( 237      ( 05 Jul 2018 10:07 )             41.9     07-05    143  |  106  |  18.0  ----------------------------<  82  4.4   |  25.0  |  0.83    Ca    9.1      05 Jul 2018 10:07  Phos  3.5     07-05  Mg     2.0     07-05      proBNP:   Lipid Profile: Date: 07-05 @ 10:07  Total cholesterol 183; Direct LDL: 109; HDL: 62; Triglycerides:59    HgA1c: Hemoglobin A1C, Whole Blood: 5.6 % (07-05 @ 10:07)    TSH:

## 2018-07-06 NOTE — PROGRESS NOTE ADULT - ASSESSMENT
56 y/o F with asthma came in to ED abnormal ECG and chest pain.          CP: Resolved    CTA of coronaries done:   Coronary artery calcium score:0. <25TH percentile. ] No identifiable   coronary calcification.  The coronary arteries.  Normal LV size and function.    dc planning

## 2018-10-01 ENCOUNTER — OUTPATIENT (OUTPATIENT)
Dept: OUTPATIENT SERVICES | Facility: HOSPITAL | Age: 58
LOS: 1 days | End: 2018-10-01
Payer: MEDICAID

## 2018-10-01 VITALS
DIASTOLIC BLOOD PRESSURE: 77 MMHG | WEIGHT: 136.69 LBS | HEIGHT: 64 IN | RESPIRATION RATE: 16 BRPM | SYSTOLIC BLOOD PRESSURE: 125 MMHG | TEMPERATURE: 97 F | HEART RATE: 67 BPM

## 2018-10-01 DIAGNOSIS — Z01.818 ENCOUNTER FOR OTHER PREPROCEDURAL EXAMINATION: ICD-10-CM

## 2018-10-01 DIAGNOSIS — Z98.89 OTHER SPECIFIED POSTPROCEDURAL STATES: Chronic | ICD-10-CM

## 2018-10-01 DIAGNOSIS — Z98.890 OTHER SPECIFIED POSTPROCEDURAL STATES: Chronic | ICD-10-CM

## 2018-10-01 DIAGNOSIS — Z29.9 ENCOUNTER FOR PROPHYLACTIC MEASURES, UNSPECIFIED: ICD-10-CM

## 2018-10-01 DIAGNOSIS — J45.909 UNSPECIFIED ASTHMA, UNCOMPLICATED: ICD-10-CM

## 2018-10-01 DIAGNOSIS — D25.0 SUBMUCOUS LEIOMYOMA OF UTERUS: ICD-10-CM

## 2018-10-01 LAB
ANION GAP SERPL CALC-SCNC: 14 MMOL/L — SIGNIFICANT CHANGE UP (ref 5–17)
BUN SERPL-MCNC: 15 MG/DL — SIGNIFICANT CHANGE UP (ref 8–20)
CALCIUM SERPL-MCNC: 9.1 MG/DL — SIGNIFICANT CHANGE UP (ref 8.6–10.2)
CHLORIDE SERPL-SCNC: 104 MMOL/L — SIGNIFICANT CHANGE UP (ref 98–107)
CO2 SERPL-SCNC: 26 MMOL/L — SIGNIFICANT CHANGE UP (ref 22–29)
CREAT SERPL-MCNC: 0.86 MG/DL — SIGNIFICANT CHANGE UP (ref 0.5–1.3)
GLUCOSE SERPL-MCNC: 89 MG/DL — SIGNIFICANT CHANGE UP (ref 70–115)
HCT VFR BLD CALC: 41.5 % — SIGNIFICANT CHANGE UP (ref 37–47)
HGB BLD-MCNC: 12.8 G/DL — SIGNIFICANT CHANGE UP (ref 12–16)
MCHC RBC-ENTMCNC: 28.9 PG — SIGNIFICANT CHANGE UP (ref 27–31)
MCHC RBC-ENTMCNC: 30.8 G/DL — LOW (ref 32–36)
MCV RBC AUTO: 93.7 FL — SIGNIFICANT CHANGE UP (ref 81–99)
PLATELET # BLD AUTO: 276 K/UL — SIGNIFICANT CHANGE UP (ref 150–400)
POTASSIUM SERPL-MCNC: 3.9 MMOL/L — SIGNIFICANT CHANGE UP (ref 3.5–5.3)
POTASSIUM SERPL-SCNC: 3.9 MMOL/L — SIGNIFICANT CHANGE UP (ref 3.5–5.3)
RBC # BLD: 4.43 M/UL — SIGNIFICANT CHANGE UP (ref 4.4–5.2)
RBC # FLD: 12.6 % — SIGNIFICANT CHANGE UP (ref 11–15.6)
SODIUM SERPL-SCNC: 144 MMOL/L — SIGNIFICANT CHANGE UP (ref 135–145)
WBC # BLD: 5.1 K/UL — SIGNIFICANT CHANGE UP (ref 4.8–10.8)
WBC # FLD AUTO: 5.1 K/UL — SIGNIFICANT CHANGE UP (ref 4.8–10.8)

## 2018-10-01 PROCEDURE — 80048 BASIC METABOLIC PNL TOTAL CA: CPT

## 2018-10-01 PROCEDURE — G0463: CPT

## 2018-10-01 PROCEDURE — 93005 ELECTROCARDIOGRAM TRACING: CPT

## 2018-10-01 PROCEDURE — 85027 COMPLETE CBC AUTOMATED: CPT

## 2018-10-01 PROCEDURE — 36415 COLL VENOUS BLD VENIPUNCTURE: CPT

## 2018-10-01 PROCEDURE — 93010 ELECTROCARDIOGRAM REPORT: CPT

## 2018-10-01 RX ORDER — CALCIUM CARBONATE 500(1250)
1 TABLET ORAL
Qty: 0 | Refills: 0 | COMMUNITY

## 2018-10-01 RX ORDER — LANOLIN ALCOHOL/MO/W.PET/CERES
1 CREAM (GRAM) TOPICAL
Qty: 0 | Refills: 0 | COMMUNITY

## 2018-10-01 RX ORDER — SODIUM CHLORIDE 9 MG/ML
3 INJECTION INTRAMUSCULAR; INTRAVENOUS; SUBCUTANEOUS ONCE
Qty: 0 | Refills: 0 | Status: DISCONTINUED | OUTPATIENT
Start: 2018-10-08 | End: 2018-10-08

## 2018-10-01 NOTE — H&P PST ADULT - HISTORY OF PRESENT ILLNESS
58 year old female 58 year old female who states that she had pain in her left lower pelvic area last month and was seen by her OBGYN doctor who did a sonogram which showed something" in her uterus. 58 year old , with h/o Asthma, presented for evaluation of intermittent LLQ pain and postmenopausal bleeding. Pelvic sono on 9/10/18 showed 2 submucosal fibroids measuring 1.0 cm and 1.5 cm. She is scheduled for dilation and curettage with hysteroscopy and hysteroscopic myomectomy.

## 2018-10-01 NOTE — H&P PST ADULT - ASSESSMENT
medications reviewed, instructions given on what medications to take and what not to take.  CAPRINI SCORE [CLOT]    AGE RELATED RISK FACTORS                                                       MOBILITY RELATED FACTORS  [ ] Age 41-60 years                                            (1 Point)                  [ ] Bed rest                                                        (1 Point)  [ ] Age: 61-74 years                                           (2 Points)                 [ ] Plaster cast                                                   (2 Points)  [ ] Age= 75 years                                              (3 Points)                 [ ] Bed bound for more than 72 hours                 (2 Points)    DISEASE RELATED RISK FACTORS                                               GENDER SPECIFIC FACTORS  [ ] Edema in the lower extremities                       (1 Point)                  [ ] Pregnancy                                                     (1 Point)  [ ] Varicose veins                                               (1 Point)                  [ ] Post-partum < 6 weeks                                   (1 Point)             [ ] BMI > 25 Kg/m2                                            (1 Point)                  [ ] Hormonal therapy  or oral contraception          (1 Point)                 [ ] Sepsis (in the previous month)                        (1 Point)                  [ ] History of pregnancy complications                 (1 point)  [ ] Pneumonia or serious lung disease                                               [ ] Unexplained or recurrent                     (1 Point)           (in the previous month)                               (1 Point)  [ ] Abnormal pulmonary function test                     (1 Point)                 SURGERY RELATED RISK FACTORS  [ ] Acute myocardial infarction                              (1 Point)                 [ ]  Section                                             (1 Point)  [ ] Congestive heart failure (in the previous month)  (1 Point)               [ ] Minor surgery                                                  (1 Point)   [ ] Inflammatory bowel disease                             (1 Point)                 [ ] Arthroscopic surgery                                        (2 Points)  [ ] Central venous access                                      (2 Points)                [ ] General surgery lasting more than 45 minutes   (2 Points)       [ ] Stroke (in the previous month)                          (5 Points)               [ ] Elective arthroplasty                                         (5 Points)                                                                                                                                               HEMATOLOGY RELATED FACTORS                                                 TRAUMA RELATED RISK FACTORS  [ ] Prior episodes of VTE                                     (3 Points)                 [ ] Fracture of the hip, pelvis, or leg                       (5 Points)  [ ] Positive family history for VTE                         (3 Points)                 [ ] Acute spinal cord injury (in the previous month)  (5 Points)  [ ] Prothrombin 41842 A                                     (3 Points)                 [ ] Paralysis  (less than 1 month)                             (5 Points)  [ ] Factor V Leiden                                             (3 Points)                  [ ] Multiple Trauma within 1 month                        (5 Points)  [ ] Lupus anticoagulants                                     (3 Points)                                                           [ ] Anticardiolipin antibodies                               (3 Points)                                                       [ ] High homocysteine in the blood                      (3 Points)                                             [ ] Other congenital or acquired thrombophilia      (3 Points)                                                [ ] Heparin induced thrombocytopenia                  (3 Points)                                          Total Score [          ] medications reviewed, instructions given on what medications to take and what not to take.  CAPRINI SCORE [CLOT]    AGE RELATED RISK FACTORS                                                       MOBILITY RELATED FACTORS  [x ] Age 41-60 years                                            (1 Point)                  [ ] Bed rest                                                        (1 Point)  [ ] Age: 61-74 years                                           (2 Points)                 [ ] Plaster cast                                                   (2 Points)  [ ] Age= 75 years                                              (3 Points)                 [ ] Bed bound for more than 72 hours                 (2 Points)    DISEASE RELATED RISK FACTORS                                               GENDER SPECIFIC FACTORS  [ ] Edema in the lower extremities                       (1 Point)                  [ ] Pregnancy                                                     (1 Point)  [ ] Varicose veins                                               (1 Point)                  [ ] Post-partum < 6 weeks                                   (1 Point)             [ ] BMI > 25 Kg/m2                                            (1 Point)                  [ ] Hormonal therapy  or oral contraception          (1 Point)                 [ ] Sepsis (in the previous month)                        (1 Point)                  [ ] History of pregnancy complications                 (1 point)  [ ] Pneumonia or serious lung disease                                               [ ] Unexplained or recurrent                     (1 Point)           (in the previous month)                               (1 Point)  [ ] Abnormal pulmonary function test                     (1 Point)                 SURGERY RELATED RISK FACTORS  [ ] Acute myocardial infarction                              (1 Point)                 [ ]  Section                                             (1 Point)  [ ] Congestive heart failure (in the previous month)  (1 Point)               [x] Minor surgery                                                  (1 Point)   [ ] Inflammatory bowel disease                             (1 Point)                 [ ] Arthroscopic surgery                                        (2 Points)  [ ] Central venous access                                      (2 Points)                [ ] General surgery lasting more than 45 minutes   (2 Points)       [ ] Stroke (in the previous month)                          (5 Points)               [ ] Elective arthroplasty                                         (5 Points)                                                                                                                                               HEMATOLOGY RELATED FACTORS                                                 TRAUMA RELATED RISK FACTORS  [ ] Prior episodes of VTE                                     (3 Points)                 [ ] Fracture of the hip, pelvis, or leg                       (5 Points)  [ ] Positive family history for VTE                         (3 Points)                 [ ] Acute spinal cord injury (in the previous month)  (5 Points)  [ ] Prothrombin 59075 A                                     (3 Points)                 [ ] Paralysis  (less than 1 month)                             (5 Points)  [ ] Factor V Leiden                                             (3 Points)                  [ ] Multiple Trauma within 1 month                        (5 Points)  [ ] Lupus anticoagulants                                     (3 Points)                                                           [ ] Anticardiolipin antibodies                               (3 Points)                                                       [ ] High homocysteine in the blood                      (3 Points)                                             [ ] Other congenital or acquired thrombophilia      (3 Points)                                                [ ] Heparin induced thrombocytopenia                  (3 Points)                                          Total Score [ 2         ]

## 2018-10-01 NOTE — H&P PST ADULT - ATTENDING COMMENTS
Postmenopausal bleeding with h/o uterine leiomyoma. For D&C hysteroscopy with hysteroscopic myomectomy.

## 2018-10-01 NOTE — H&P PST ADULT - PROBLEM SELECTOR PLAN 1
D&C hysteroscopy possible polypectomy using neto sure and related procedure. Medical and cardiac clearance pending D&C hysteroscopy with hysteroscopic myomectomy, possible polypectomy using Myosure and related procedure. Medical and cardiac clearance pending

## 2018-10-01 NOTE — H&P PST ADULT - NSANTHOSAYNRD_GEN_A_CORE
No. RUBINA screening performed.  STOP BANG Legend: 0-2 = LOW Risk; 3-4 = INTERMEDIATE Risk; 5-8 = HIGH Risk

## 2018-10-02 PROBLEM — J45.909 UNSPECIFIED ASTHMA, UNCOMPLICATED: Chronic | Status: INACTIVE | Noted: 2018-07-04 | Resolved: 2018-10-01

## 2018-10-02 PROBLEM — R42 DIZZINESS AND GIDDINESS: Chronic | Status: INACTIVE | Noted: 2018-07-04 | Resolved: 2018-10-01

## 2018-10-02 PROBLEM — G47.00 INSOMNIA, UNSPECIFIED: Chronic | Status: INACTIVE | Noted: 2018-07-04 | Resolved: 2018-10-01

## 2018-10-03 PROBLEM — Z00.00 ENCOUNTER FOR PREVENTIVE HEALTH EXAMINATION: Status: ACTIVE | Noted: 2018-10-03

## 2018-10-07 ENCOUNTER — TRANSCRIPTION ENCOUNTER (OUTPATIENT)
Age: 58
End: 2018-10-07

## 2018-10-08 ENCOUNTER — OUTPATIENT (OUTPATIENT)
Dept: OUTPATIENT SERVICES | Facility: HOSPITAL | Age: 58
LOS: 1 days | End: 2018-10-08
Payer: MEDICAID

## 2018-10-08 ENCOUNTER — RESULT REVIEW (OUTPATIENT)
Age: 58
End: 2018-10-08

## 2018-10-08 VITALS
WEIGHT: 139.99 LBS | SYSTOLIC BLOOD PRESSURE: 119 MMHG | HEART RATE: 59 BPM | DIASTOLIC BLOOD PRESSURE: 69 MMHG | TEMPERATURE: 98 F | RESPIRATION RATE: 16 BRPM | HEIGHT: 64 IN

## 2018-10-08 VITALS
OXYGEN SATURATION: 100 % | HEART RATE: 61 BPM | SYSTOLIC BLOOD PRESSURE: 103 MMHG | DIASTOLIC BLOOD PRESSURE: 56 MMHG | RESPIRATION RATE: 15 BRPM

## 2018-10-08 DIAGNOSIS — Z98.89 OTHER SPECIFIED POSTPROCEDURAL STATES: Chronic | ICD-10-CM

## 2018-10-08 DIAGNOSIS — N95.0 POSTMENOPAUSAL BLEEDING: ICD-10-CM

## 2018-10-08 DIAGNOSIS — Z98.890 OTHER SPECIFIED POSTPROCEDURAL STATES: Chronic | ICD-10-CM

## 2018-10-08 DIAGNOSIS — R10.32 LEFT LOWER QUADRANT PAIN: ICD-10-CM

## 2018-10-08 DIAGNOSIS — D25.0 SUBMUCOUS LEIOMYOMA OF UTERUS: ICD-10-CM

## 2018-10-08 PROCEDURE — 58558 HYSTEROSCOPY BIOPSY: CPT

## 2018-10-08 PROCEDURE — 88305 TISSUE EXAM BY PATHOLOGIST: CPT | Mod: 26

## 2018-10-08 PROCEDURE — 88305 TISSUE EXAM BY PATHOLOGIST: CPT

## 2018-10-08 RX ORDER — ONDANSETRON 8 MG/1
4 TABLET, FILM COATED ORAL ONCE
Qty: 0 | Refills: 0 | Status: DISCONTINUED | OUTPATIENT
Start: 2018-10-08 | End: 2018-10-08

## 2018-10-08 RX ORDER — TRIAMCINOLONE ACETONIDE 55 MCG
0 AEROSOL, SPRAY (GRAM) NASAL
Qty: 0 | Refills: 0 | COMMUNITY

## 2018-10-08 RX ORDER — FENTANYL CITRATE 50 UG/ML
25 INJECTION INTRAVENOUS
Qty: 0 | Refills: 0 | Status: DISCONTINUED | OUTPATIENT
Start: 2018-10-08 | End: 2018-10-08

## 2018-10-08 RX ORDER — ALBUTEROL 90 UG/1
2 AEROSOL, METERED ORAL
Qty: 0 | Refills: 0 | COMMUNITY

## 2018-10-08 RX ORDER — ALBUTEROL 90 UG/1
0 AEROSOL, METERED ORAL
Qty: 0 | Refills: 0 | COMMUNITY

## 2018-10-08 RX ORDER — SODIUM CHLORIDE 9 MG/ML
1000 INJECTION, SOLUTION INTRAVENOUS
Qty: 0 | Refills: 0 | Status: DISCONTINUED | OUTPATIENT
Start: 2018-10-08 | End: 2018-10-08

## 2018-10-08 RX ORDER — IBUPROFEN 200 MG
1 TABLET ORAL
Qty: 28 | Refills: 0
Start: 2018-10-08 | End: 2018-10-14

## 2018-10-08 NOTE — BRIEF OPERATIVE NOTE - PROCEDURE
<<-----Click on this checkbox to enter Procedure Hysteroscopy with dilation and curettage of uterus  10/08/2018    Active  [id number]

## 2018-10-08 NOTE — ASU DISCHARGE PLAN (ADULT/PEDIATRIC). - ACTIVITY LEVEL
x 1 week/no heavy lifting/no douching/no intercourse/no tampons/weight bearing as tolerated/nothing per vagina/no tub baths

## 2018-10-08 NOTE — BRIEF OPERATIVE NOTE - PRE-OP DX
Postmenopausal bleeding  10/08/2018    Nemo Laureano  Submucous leiomyoma of uterus  10/08/2018    Nemo Laureano

## 2018-10-08 NOTE — ASU DISCHARGE PLAN (ADULT/PEDIATRIC). - MEDICATION SUMMARY - MEDICATIONS TO TAKE
I will START or STAY ON the medications listed below when I get home from the hospital:    ibuprofen 600 mg oral tablet  -- 1 tab(s) by mouth every 6 hours   -- Do not take this drug if you are pregnant.  It is very important that you take or use this exactly as directed.  Do not skip doses or discontinue unless directed by your doctor.  May cause drowsiness or dizziness.  Obtain medical advice before taking any non-prescription drugs as some may affect the action of this medication.  Take with food or milk.    -- Indication: For Postmenopausal bleeding

## 2018-10-11 LAB — SURGICAL PATHOLOGY FINAL REPORT - CH: SIGNIFICANT CHANGE UP

## 2018-10-22 ENCOUNTER — APPOINTMENT (OUTPATIENT)
Dept: UROLOGY | Facility: CLINIC | Age: 58
End: 2018-10-22
Payer: MEDICAID

## 2018-10-22 VITALS
WEIGHT: 139 LBS | SYSTOLIC BLOOD PRESSURE: 116 MMHG | HEIGHT: 64 IN | BODY MASS INDEX: 23.73 KG/M2 | RESPIRATION RATE: 14 BRPM | DIASTOLIC BLOOD PRESSURE: 79 MMHG | HEART RATE: 66 BPM

## 2018-10-22 DIAGNOSIS — Z78.9 OTHER SPECIFIED HEALTH STATUS: ICD-10-CM

## 2018-10-22 DIAGNOSIS — Z80.42 FAMILY HISTORY OF MALIGNANT NEOPLASM OF PROSTATE: ICD-10-CM

## 2018-10-22 LAB
BILIRUB UR QL STRIP: NORMAL
CLARITY UR: CLEAR
COLLECTION METHOD: NORMAL
GLUCOSE UR-MCNC: NORMAL
HCG UR QL: 0.2 EU/DL
HGB UR QL STRIP.AUTO: NORMAL
KETONES UR-MCNC: NORMAL
LEUKOCYTE ESTERASE UR QL STRIP: NORMAL
NITRITE UR QL STRIP: NORMAL
PH UR STRIP: 6
PROT UR STRIP-MCNC: NORMAL
SP GR UR STRIP: 1.02

## 2018-10-22 PROCEDURE — 81003 URINALYSIS AUTO W/O SCOPE: CPT | Mod: QW

## 2018-10-22 PROCEDURE — 99203 OFFICE O/P NEW LOW 30 MIN: CPT | Mod: 25

## 2018-10-22 RX ORDER — ALBUTEROL 90 MCG
AEROSOL (GRAM) INHALATION
Refills: 0 | Status: ACTIVE | COMMUNITY

## 2018-10-29 ENCOUNTER — APPOINTMENT (OUTPATIENT)
Dept: CT IMAGING | Facility: CLINIC | Age: 58
End: 2018-10-29
Payer: MEDICAID

## 2018-10-29 ENCOUNTER — OUTPATIENT (OUTPATIENT)
Dept: OUTPATIENT SERVICES | Facility: HOSPITAL | Age: 58
LOS: 1 days | End: 2018-10-29
Payer: COMMERCIAL

## 2018-10-29 DIAGNOSIS — Z00.8 ENCOUNTER FOR OTHER GENERAL EXAMINATION: ICD-10-CM

## 2018-10-29 DIAGNOSIS — Z98.890 OTHER SPECIFIED POSTPROCEDURAL STATES: Chronic | ICD-10-CM

## 2018-10-29 DIAGNOSIS — Z98.89 OTHER SPECIFIED POSTPROCEDURAL STATES: Chronic | ICD-10-CM

## 2018-10-29 PROCEDURE — 74170 CT ABD WO CNTRST FLWD CNTRST: CPT | Mod: 26

## 2018-10-29 PROCEDURE — 74170 CT ABD WO CNTRST FLWD CNTRST: CPT

## 2018-10-29 PROCEDURE — 82565 ASSAY OF CREATININE: CPT

## 2018-11-08 ENCOUNTER — APPOINTMENT (OUTPATIENT)
Dept: UROLOGY | Facility: CLINIC | Age: 58
End: 2018-11-08
Payer: MEDICAID

## 2018-11-08 VITALS
BODY MASS INDEX: 23.73 KG/M2 | WEIGHT: 139 LBS | RESPIRATION RATE: 14 BRPM | SYSTOLIC BLOOD PRESSURE: 139 MMHG | DIASTOLIC BLOOD PRESSURE: 87 MMHG | HEIGHT: 64 IN | HEART RATE: 60 BPM

## 2018-11-08 PROCEDURE — 99213 OFFICE O/P EST LOW 20 MIN: CPT

## 2018-11-27 ENCOUNTER — RECORD ABSTRACTING (OUTPATIENT)
Age: 58
End: 2018-11-27

## 2018-11-27 DIAGNOSIS — R23.2 FLUSHING: ICD-10-CM

## 2018-11-27 DIAGNOSIS — Z82.62 FAMILY HISTORY OF OSTEOPOROSIS: ICD-10-CM

## 2018-11-27 LAB — CYTOLOGY CVX/VAG DOC THIN PREP: NORMAL

## 2018-12-10 ENCOUNTER — APPOINTMENT (OUTPATIENT)
Dept: OBGYN | Facility: CLINIC | Age: 58
End: 2018-12-10
Payer: MEDICAID

## 2018-12-10 VITALS
HEIGHT: 64 IN | BODY MASS INDEX: 23.73 KG/M2 | WEIGHT: 139 LBS | DIASTOLIC BLOOD PRESSURE: 72 MMHG | SYSTOLIC BLOOD PRESSURE: 118 MMHG

## 2018-12-10 LAB
BILIRUB UR QL STRIP: NORMAL
COLLECTION METHOD: NORMAL
DATE COLLECTED: NORMAL
GLUCOSE UR-MCNC: NORMAL
HEMOCCULT SP1 STL QL: NEGATIVE
HGB UR QL STRIP.AUTO: NORMAL
KETONES UR-MCNC: NORMAL
LEUKOCYTE ESTERASE UR QL STRIP: NORMAL
NITRITE UR QL STRIP: NORMAL
PROT UR STRIP-MCNC: NORMAL
SP GR UR STRIP: 1.01

## 2018-12-10 PROCEDURE — 82270 OCCULT BLOOD FECES: CPT

## 2018-12-10 PROCEDURE — 81003 URINALYSIS AUTO W/O SCOPE: CPT | Mod: QW

## 2018-12-10 PROCEDURE — 99396 PREV VISIT EST AGE 40-64: CPT

## 2018-12-10 RX ORDER — CALCIUM CARBONATE/VITAMIN D3 500 MG-2.5
TABLET,CHEWABLE ORAL
Refills: 0 | Status: DISCONTINUED | COMMUNITY
End: 2018-12-10

## 2018-12-14 LAB — CYTOLOGY CVX/VAG DOC THIN PREP: NORMAL

## 2019-02-25 ENCOUNTER — APPOINTMENT (OUTPATIENT)
Dept: MAMMOGRAPHY | Facility: CLINIC | Age: 59
End: 2019-02-25

## 2019-02-25 ENCOUNTER — APPOINTMENT (OUTPATIENT)
Dept: ULTRASOUND IMAGING | Facility: CLINIC | Age: 59
End: 2019-02-25

## 2019-03-17 ENCOUNTER — FORM ENCOUNTER (OUTPATIENT)
Age: 59
End: 2019-03-17

## 2019-03-18 ENCOUNTER — APPOINTMENT (OUTPATIENT)
Dept: ULTRASOUND IMAGING | Facility: CLINIC | Age: 59
End: 2019-03-18
Payer: MEDICAID

## 2019-03-18 ENCOUNTER — OUTPATIENT (OUTPATIENT)
Dept: OUTPATIENT SERVICES | Facility: HOSPITAL | Age: 59
LOS: 1 days | End: 2019-03-18
Payer: COMMERCIAL

## 2019-03-18 ENCOUNTER — APPOINTMENT (OUTPATIENT)
Dept: MAMMOGRAPHY | Facility: CLINIC | Age: 59
End: 2019-03-18
Payer: MEDICAID

## 2019-03-18 DIAGNOSIS — Z12.31 ENCOUNTER FOR SCREENING MAMMOGRAM FOR MALIGNANT NEOPLASM OF BREAST: ICD-10-CM

## 2019-03-18 DIAGNOSIS — Z98.89 OTHER SPECIFIED POSTPROCEDURAL STATES: Chronic | ICD-10-CM

## 2019-03-18 DIAGNOSIS — Z98.890 OTHER SPECIFIED POSTPROCEDURAL STATES: Chronic | ICD-10-CM

## 2019-03-18 PROCEDURE — 77067 SCR MAMMO BI INCL CAD: CPT | Mod: 26

## 2019-03-18 PROCEDURE — 77063 BREAST TOMOSYNTHESIS BI: CPT | Mod: 26

## 2019-03-18 PROCEDURE — 77063 BREAST TOMOSYNTHESIS BI: CPT

## 2019-03-18 PROCEDURE — 77067 SCR MAMMO BI INCL CAD: CPT

## 2019-03-18 PROCEDURE — 76641 ULTRASOUND BREAST COMPLETE: CPT | Mod: 26,50

## 2019-03-18 PROCEDURE — 76641 ULTRASOUND BREAST COMPLETE: CPT

## 2019-04-15 NOTE — H&P PST ADULT - WEIGHT IN LBS
[facility] Nephrology Daily Progress Note    Date of Service  4/15/2019    Chief Complaint  69 y.o. male admitted 4/13/2019 with expressive aphasia.  Found by wife on floor unresponsive in pool of urine and feces with PD catheter uncapped.     Interval Problem Update  4/13/19 - Renal consult.  Ordered PD.  Sent PD fluid for cell count, gram stain, and culture because of possible contamination prior to admission.  4/14/19 - Feels fine.  Speech is now ~normal.   4/15/19 - Feels ok. CCPD is going well. Net UF is 200 mL.    Review of Systems  Review of Systems   Unable to perform ROS: Language        Physical Exam  Temp:  [36.3 °C (97.4 °F)-37.7 °C (99.8 °F)] 36.3 °C (97.4 °F)  Pulse:  [89-98] 97  Resp:  [16-17] 16  BP: (119-155)/(48-98) 119/98  SpO2:  [90 %-92 %] 92 %    Physical Exam   Constitutional: He appears well-developed and well-nourished.   HENT:   Head: Normocephalic and atraumatic.   Eyes: Pupils are equal, round, and reactive to light. Conjunctivae and EOM are normal.   Neck: Normal range of motion. Neck supple.   Cardiovascular: Normal rate and regular rhythm.    Pulmonary/Chest: Effort normal and breath sounds normal.   Abdominal: Soft. Bowel sounds are normal.   PD catheter in place.   Musculoskeletal: Normal range of motion.   Neurological: He is alert.   Skin: Skin is warm and dry.       Fluids    Intake/Output Summary (Last 24 hours) at 04/15/19 1136  Last data filed at 04/15/19 0632   Gross per 24 hour   Intake              726 ml   Output              200 ml   Net              526 ml       Laboratory  Recent Labs      04/13/19   0805  04/14/19   0321  04/15/19   0336   WBC  11.6*  9.1  8.5   RBC  4.33*  4.08*  4.19*   HEMOGLOBIN  13.0*  12.6*  12.8*   HEMATOCRIT  41.2*  38.0*  40.5*   MCV  95.2  93.1  96.7   MCH  30.0  30.9  30.5   MCHC  31.6*  33.2*  31.6*   RDW  47.1  46.2  48.2   PLATELETCT  189  199  167   MPV  11.3  11.6  11.4     Recent Labs      04/13/19   0805  04/14/19   0321  04/15/19    0336   SODIUM  142  140  139   POTASSIUM  4.9  5.1  4.6   CHLORIDE  104  106  104   CO2  24  21  21   GLUCOSE  137*  114*  239*   BUN  63*  61*  63*   CREATININE  8.44*  8.23*  8.56*   CALCIUM  10.4  9.9  9.6     Recent Labs      04/13/19   0805  04/14/19   1102  04/15/19   0336   APTT  53.2*   --    --    INR  4.80*  5.56*  3.22*         Recent Labs      04/14/19   0321   TRIGLYCERIDE  186*   HDL  16*   LDL  46       Assessment/Plan  No new Assessment & Plan notes have been filed under this hospital service since the last note was generated.  Service: Nephrology     Impression:   ESRD on CCPD   TIA   DM   HTN   Aortic valve replacement   Pacemaker    Plan:   No changes to CCPD regimen. Continue CCPD.         136.6

## 2019-12-17 ENCOUNTER — APPOINTMENT (OUTPATIENT)
Dept: OBGYN | Facility: CLINIC | Age: 59
End: 2019-12-17
Payer: MEDICAID

## 2019-12-17 VITALS
BODY MASS INDEX: 24.92 KG/M2 | WEIGHT: 146 LBS | DIASTOLIC BLOOD PRESSURE: 72 MMHG | SYSTOLIC BLOOD PRESSURE: 120 MMHG | HEIGHT: 64 IN

## 2019-12-17 DIAGNOSIS — Z01.411 ENCOUNTER FOR GYNECOLOGICAL EXAMINATION (GENERAL) (ROUTINE) WITH ABNORMAL FINDINGS: ICD-10-CM

## 2019-12-17 DIAGNOSIS — Z87.42 PERSONAL HISTORY OF OTHER DISEASES OF THE FEMALE GENITAL TRACT: ICD-10-CM

## 2019-12-17 DIAGNOSIS — N28.1 CYST OF KIDNEY, ACQUIRED: ICD-10-CM

## 2019-12-17 DIAGNOSIS — N95.2 POSTMENOPAUSAL ATROPHIC VAGINITIS: ICD-10-CM

## 2019-12-17 LAB
BILIRUB UR QL STRIP: NORMAL
DATE COLLECTED: NORMAL
GLUCOSE UR-MCNC: NORMAL
HCG UR QL: 0.2 EU/DL
HEMOCCULT SP1 STL QL: NEGATIVE
HGB UR QL STRIP.AUTO: NORMAL
KETONES UR-MCNC: NORMAL
LEUKOCYTE ESTERASE UR QL STRIP: NORMAL
NITRITE UR QL STRIP: NORMAL
PH UR STRIP: 5.5
PROT UR STRIP-MCNC: NORMAL
QUALITY CONTROL: YES
SP GR UR STRIP: 1.03

## 2019-12-17 PROCEDURE — 99396 PREV VISIT EST AGE 40-64: CPT

## 2019-12-17 PROCEDURE — 81003 URINALYSIS AUTO W/O SCOPE: CPT | Mod: QW

## 2019-12-17 PROCEDURE — 82270 OCCULT BLOOD FECES: CPT

## 2019-12-18 PROBLEM — Z01.411 ENCOUNTER FOR GYNECOLOGICAL EXAMINATION WITH ABNORMAL FINDING: Status: RESOLVED | Noted: 2018-12-10 | Resolved: 2019-12-18

## 2019-12-18 PROBLEM — Z87.42 HISTORY OF POSTMENOPAUSAL BLEEDING: Status: RESOLVED | Noted: 2018-11-27 | Resolved: 2019-12-18

## 2019-12-18 PROBLEM — N28.1 RENAL CYST, ACQUIRED: Status: RESOLVED | Noted: 2018-10-22 | Resolved: 2019-12-18

## 2019-12-18 PROBLEM — N28.1 RENAL CYST: Status: RESOLVED | Noted: 2018-10-22 | Resolved: 2019-12-18

## 2019-12-18 PROBLEM — N95.2 POSTMENOPAUSAL ATROPHIC VAGINITIS: Status: RESOLVED | Noted: 2018-12-10 | Resolved: 2019-12-18

## 2019-12-18 LAB — HPV HIGH+LOW RISK DNA PNL CVX: NOT DETECTED

## 2019-12-18 NOTE — HISTORY OF PRESENT ILLNESS
[Last Mammogram ___] : Last Mammogram was [unfilled] [Last Pap ___] : Last cervical pap smear was [unfilled] [Postmenopausal] : is postmenopausal [Menarche Age: ____] : age at menarche was [unfilled] [Sexually Active] : is sexually active [Tubal Ligation] : has had a tubal ligation [Male ___] : [unfilled] male [NA] : N/A [Last Colonoscopy ___] : Last colonoscopy [unfilled] [de-identified] : Last US Breast 01/17/2018 [Currently In Menopause] : currently in menopause [Monogamous] : is not monogamous [Contraception] : does not use contraception

## 2019-12-18 NOTE — END OF VISIT
[FreeTextEntry3] : I, Osmin Mansfield, acted solely as a scribe for Dr. Obregon on this date 12/17/2019.\par All medical record entries made by the Scribe were at my, Dr. Obregon's direction and personally dictated by me on  12/17/2019. I have reviewed the chart and agree that the record accurately reflects my personal performance of the history, physical exam, assessment and plan. I have also personally directed, reviewed, and agreed with the chart.\par \par

## 2019-12-18 NOTE — PHYSICAL EXAM
[Alert] : alert [Awake] : awake [Examination Of The Breasts] : a normal appearance [No Masses] : no breast masses were palpable [No Discharge] : no discharge [No Lesions] : no genitalia lesions [Labia Majora] : labia major [Labia Minora] : labia minora [Normal] : clitoris [Pap Obtained] : a Pap smear was performed [No Bleeding] : there was no active vaginal bleeding [No Tenderness] : no rectal tenderness [Uterine Adnexae] : were not tender and not enlarged [Nl Sphincter Tone] : normal sphincter tone [Acute Distress] : no acute distress [Normal Appearance] : was normal in appearance [Mass] : no breast mass [Nipple Discharge] : no nipple discharge [Axillary LAD] : no axillary lymphadenopathy [Soft] : soft [Tender] : non tender [Distended] : not distended [Oriented x3] : oriented to person, place, and time [Depressed Mood] : not depressed [Atrophy] : atrophy [Flat Affect] : affect not flat [Occult Blood] : occult blood test from digital rectal exam was negative

## 2019-12-20 LAB — CYTOLOGY CVX/VAG DOC THIN PREP: ABNORMAL

## 2020-01-01 NOTE — ED CDU PROVIDER INITIAL DAY NOTE - CARDIAC, MLM
Called RT for treatment   Normal rate, regular rhythm.  Heart sounds S1, S2.  No murmurs, rubs or gallops.

## 2020-01-17 NOTE — ED PROVIDER NOTE - CPE EDP [facility] NORM
Anticoagulation Summary  As of 2020    INR goal:   2.5-3.0   TTR:   61.4 % (4.7 y)   INR used for dosin.60 (2020)   Warfarin maintenance plan:   4.5 mg (3 mg x 1.5) every Thu; 3 mg (3 mg x 1) all other days   Weekly warfarin total:   22.5 mg   Plan last modified:   Samantha M Filter (10/31/2019)   Next INR check:   2020   Priority:   Maintenance   Target end date:   Indefinite    Indications    Atrial fibrillation (HCC) [I48.91]  Long term current use of anticoagulant therapy [Z79.01]             Anticoagulation Episode Summary     INR check location:   Home Draw    Preferred lab:       Send INR reminders to:       Comments:   Waqar  - [phone] -   goal range changed to 2.5-3.2 per raghu vaca 2019       Anticoagulation Care Providers     Provider Role Specialty Phone number    Hu Gamez M.D. Referring Cardiac Electrophysiology [phone]    [facility] Responsible  [phone]    Clarisse BellD Responsible          Anticoagulation Patient Findings      INR  therapeutic.   Left a voice message for the patient, asked patient to please call the anticoagulation clinic if they have any signs/symptoms of bleeding and/or thrombosis or any changes to diet or medications.    Follow up appointment in 2 week(s)    Continue weekly warfarin dose as noted      Skip Gonzalez, PharmD, MS, BCACP, LCC    This note was created using voice recognition software (Dragon). The accuracy of the dictation is limited by the abilities of the software. I have reviewed the note prior to signing, however some errors in grammar and context are still possible. If you have any questions related to this note please do not hesitate to contact our office.        normal...

## 2020-03-09 ENCOUNTER — APPOINTMENT (OUTPATIENT)
Dept: OBGYN | Facility: CLINIC | Age: 60
End: 2020-03-09
Payer: MEDICAID

## 2020-03-09 VITALS
WEIGHT: 143 LBS | SYSTOLIC BLOOD PRESSURE: 120 MMHG | BODY MASS INDEX: 24.41 KG/M2 | DIASTOLIC BLOOD PRESSURE: 76 MMHG | HEIGHT: 64 IN

## 2020-03-09 DIAGNOSIS — N64.4 MASTODYNIA: ICD-10-CM

## 2020-03-09 DIAGNOSIS — Z12.39 ENCOUNTER FOR OTHER SCREENING FOR MALIGNANT NEOPLASM OF BREAST: ICD-10-CM

## 2020-03-09 DIAGNOSIS — Z12.11 ENCOUNTER FOR SCREENING FOR MALIGNANT NEOPLASM OF COLON: ICD-10-CM

## 2020-03-09 PROCEDURE — 99213 OFFICE O/P EST LOW 20 MIN: CPT

## 2020-03-13 NOTE — PHYSICAL EXAM
[Awake] : awake [Alert] : alert [Examination Of The Breasts] : a normal appearance [Normal] : normal [No Discharge] : no discharge [No Masses] : no breast masses were palpable [Mass] : no breast mass [Tender] : no tenderness [Nipple Discharge] : no nipple discharge [Axillary LAD] : no axillary lymphadenopathy [Axillary Lymph Nodes Enlarged Bilaterally] : no enlarged nodes

## 2020-03-13 NOTE — HISTORY OF PRESENT ILLNESS
[___ Month(s) Ago] : [unfilled] month(s) ago [Good] : being in good health [Healthy Diet] : a healthy diet [Regular Exercise] : regular exercise [Last Mammogram ___] : Last Mammogram was [unfilled] [Last Colonoscopy ___] : Last colonoscopy [unfilled] [Last Pap ___] : Last cervical pap smear was [unfilled] [Postmenopausal] : is postmenopausal [Pregnancy History] : pregnancy history: [Total Preg ___] : [unfilled] [Full Term ___] : [unfilled] [Living ___] : [unfilled] [Monogamous (Male Partner)] : is monogamous with a male partner [Menarche Age: ____] : age at menarche was [unfilled] [Sexually Active] : is sexually active [Monogamous] : is monogamous [Male ___] : [unfilled] male [Definite:  ___ (Date)] : the last menstrual period was [unfilled] [Weight Concerns] : no concerns with her weight [de-identified] : B U/S 03/18/2019 BR 2

## 2020-03-13 NOTE — DISCUSSION/SUMMARY
[FreeTextEntry1] : Esperanza was referred for a diagnostic mammogram and breast sonogram.  There was no abnormal finding on her breast exam today. she is advised if her imaging is normal and that sensation continues she should follow up for further evaluation.\par \par

## 2020-07-18 ENCOUNTER — RESULT REVIEW (OUTPATIENT)
Age: 60
End: 2020-07-18

## 2020-07-18 ENCOUNTER — OUTPATIENT (OUTPATIENT)
Dept: OUTPATIENT SERVICES | Facility: HOSPITAL | Age: 60
LOS: 1 days | End: 2020-07-18
Payer: MEDICAID

## 2020-07-18 ENCOUNTER — APPOINTMENT (OUTPATIENT)
Dept: MAMMOGRAPHY | Facility: CLINIC | Age: 60
End: 2020-07-18
Payer: MEDICAID

## 2020-07-18 ENCOUNTER — APPOINTMENT (OUTPATIENT)
Dept: ULTRASOUND IMAGING | Facility: CLINIC | Age: 60
End: 2020-07-18
Payer: MEDICAID

## 2020-07-18 DIAGNOSIS — N64.4 MASTODYNIA: ICD-10-CM

## 2020-07-18 DIAGNOSIS — Z98.89 OTHER SPECIFIED POSTPROCEDURAL STATES: Chronic | ICD-10-CM

## 2020-07-18 DIAGNOSIS — Z98.890 OTHER SPECIFIED POSTPROCEDURAL STATES: Chronic | ICD-10-CM

## 2020-07-18 PROCEDURE — 77067 SCR MAMMO BI INCL CAD: CPT

## 2020-07-18 PROCEDURE — 77063 BREAST TOMOSYNTHESIS BI: CPT | Mod: 26

## 2020-07-18 PROCEDURE — 77063 BREAST TOMOSYNTHESIS BI: CPT

## 2020-07-18 PROCEDURE — 76641 ULTRASOUND BREAST COMPLETE: CPT | Mod: 26,50

## 2020-07-18 PROCEDURE — 77067 SCR MAMMO BI INCL CAD: CPT | Mod: 26

## 2020-07-18 PROCEDURE — 76641 ULTRASOUND BREAST COMPLETE: CPT

## 2020-07-23 NOTE — ASU DISCHARGE PLAN (ADULT/PEDIATRIC). - ON
pleasant, well nourished, well developed, in no acute distress , normal communication ability 2 weeks

## 2020-07-29 NOTE — ASU PATIENT PROFILE, ADULT - NS SC CAGE ALCOHOL CUT DOWN
Patient ambulating with steady gait. A&O x4. c/o RLQ abd pain. Patient noted 
laughing, conversive, no limitation on ROM. no facial grimacing noted. patient 
immediately asks for "shot of dilaudid or demerol" upon making it into the 
gurney. Patient states pain has been going on x3-4 wks. Denies any N / V / D. 
Patient denies any bloody stool. Denies any CP / Blurred vision / HA. Breathing 
even and unlabored. no cough or SOB noted. Speech is clear and able to make 
needs known / follow commands. safety precautions implemented. no

## 2020-07-30 ENCOUNTER — RESULT REVIEW (OUTPATIENT)
Age: 60
End: 2020-07-30

## 2020-07-30 ENCOUNTER — APPOINTMENT (OUTPATIENT)
Dept: ULTRASOUND IMAGING | Facility: CLINIC | Age: 60
End: 2020-07-30
Payer: MEDICAID

## 2020-07-30 ENCOUNTER — OUTPATIENT (OUTPATIENT)
Dept: OUTPATIENT SERVICES | Facility: HOSPITAL | Age: 60
LOS: 1 days | End: 2020-07-30
Payer: MEDICAID

## 2020-07-30 ENCOUNTER — APPOINTMENT (OUTPATIENT)
Dept: MAMMOGRAPHY | Facility: CLINIC | Age: 60
End: 2020-07-30
Payer: MEDICAID

## 2020-07-30 DIAGNOSIS — Z00.00 ENCOUNTER FOR GENERAL ADULT MEDICAL EXAMINATION WITHOUT ABNORMAL FINDINGS: ICD-10-CM

## 2020-07-30 DIAGNOSIS — Z98.890 OTHER SPECIFIED POSTPROCEDURAL STATES: Chronic | ICD-10-CM

## 2020-07-30 DIAGNOSIS — Z98.89 OTHER SPECIFIED POSTPROCEDURAL STATES: Chronic | ICD-10-CM

## 2020-07-30 DIAGNOSIS — R92.8 OTHER ABNORMAL AND INCONCLUSIVE FINDINGS ON DIAGNOSTIC IMAGING OF BREAST: ICD-10-CM

## 2020-07-30 PROCEDURE — 77065 DX MAMMO INCL CAD UNI: CPT

## 2020-07-30 PROCEDURE — G0279: CPT | Mod: 26

## 2020-07-30 PROCEDURE — 77065 DX MAMMO INCL CAD UNI: CPT | Mod: 26,LT

## 2020-07-30 PROCEDURE — 76641 ULTRASOUND BREAST COMPLETE: CPT

## 2020-07-30 PROCEDURE — 76641 ULTRASOUND BREAST COMPLETE: CPT | Mod: 26,LT

## 2020-07-30 PROCEDURE — G0279: CPT

## 2020-12-21 NOTE — ED ADULT NURSE NOTE - AS SC BRADEN FRICTION
2) potential problem

## 2021-01-08 ENCOUNTER — APPOINTMENT (OUTPATIENT)
Dept: OBGYN | Facility: CLINIC | Age: 61
End: 2021-01-08
Payer: MEDICAID

## 2021-01-08 VITALS
TEMPERATURE: 96.9 F | WEIGHT: 146 LBS | HEIGHT: 64 IN | DIASTOLIC BLOOD PRESSURE: 80 MMHG | SYSTOLIC BLOOD PRESSURE: 130 MMHG | BODY MASS INDEX: 24.92 KG/M2

## 2021-01-08 LAB
BILIRUB UR QL STRIP: NORMAL
COLLECTION METHOD: NORMAL
GLUCOSE UR-MCNC: NORMAL
HCG UR QL: 0.2 EU/DL
HGB UR QL STRIP.AUTO: NORMAL
KETONES UR-MCNC: NORMAL
LEUKOCYTE ESTERASE UR QL STRIP: NORMAL
NITRITE UR QL STRIP: NORMAL
PH UR STRIP: 7
PROT UR STRIP-MCNC: NORMAL
SP GR UR STRIP: 1.02

## 2021-01-08 PROCEDURE — 99072 ADDL SUPL MATRL&STAF TM PHE: CPT

## 2021-01-08 PROCEDURE — 99212 OFFICE O/P EST SF 10 MIN: CPT

## 2021-01-08 PROCEDURE — 81003 URINALYSIS AUTO W/O SCOPE: CPT | Mod: QW

## 2021-01-08 NOTE — END OF VISIT
[FreeTextEntry3] : I, Concetta Holman, solely acted as scribe for Dr. Dinora Cortez on 01/08/2021.\par All medical entries made by the Scribe were at my, Dr. Cortez's direction and personally dictated by me on 01/08/2021. I have reviewed the chart and agree that the record accurately reflects my personal performance of the history, physical exam, assessment and plan. I have also personally directed, reviewed, and agreed with the chart.

## 2021-01-08 NOTE — HISTORY OF PRESENT ILLNESS
[Patient reported PAP Smear was normal] : Patient reported PAP Smear was normal [Tubal Occlusion] : has had a tubal occlusion [Y] : Patient is sexually active [Monogamous (Male Partner)] : is monogamous with a male partner [Ultrasound] : ultrasound [Menarche Age: ____] : age at menarche was [unfilled] [No] : Patient does not have concerns regarding sex [Currently Active] : currently active [Men] : men [Mammogramdate] : 07/18/20 [TextBox_19] : REPEAR LEFT 07/30/20 BR 2 [BreastSonogramDate] : 07/18/20 [TextBox_25] : REPEAR LEFT 07/30/20 BR 2 [PapSmeardate] : 12/17/19 [TextBox_31] : NEG [ColonoscopyDate] : 2015 [LMPDate] : 2010 [PGHxTotal] : 4 [[facility] [TextBox_27] : Pelvic U/S 09/10/18 [TextBox_6] : 2010 [TextBox_9] : 12 [FreeTextEntry1] : 2010 [FreeTextEntry2] : 1 PARTNER

## 2021-01-08 NOTE — REVIEW OF SYSTEMS
[Negative] : Heme/Lymph [Patient Intake Form Reviewed] : Patient intake form was reviewed [Pelvic pain] : pelvic pain [Fever] : no fever [Chills] : no chills [Dyspnea] : no dyspnea [Cough] : no cough [Chest Pain] : no chest pain [Abdominal Pain] : no abdominal pain [Abn Vaginal bleeding] : no abnormal vaginal bleeding

## 2021-01-08 NOTE — DISCUSSION/SUMMARY
[FreeTextEntry1] : Normal pelvic exam today. She visibly expressed pain on palpation in her right inner leg. I discussed with the patient that based on her pelvic exam, her pain is likely not gynecologically related.  Pain management discussed including heat packs/patches. Recommended to follow up with primary to rule out musculoskeletal issues. She expressed understanding. \par \par She will RTO for her annual exam on March 15, 2021. All questions answered.\par

## 2021-02-23 ENCOUNTER — APPOINTMENT (OUTPATIENT)
Dept: OBGYN | Facility: CLINIC | Age: 61
End: 2021-02-23
Payer: MEDICAID

## 2021-02-23 ENCOUNTER — APPOINTMENT (OUTPATIENT)
Dept: OBGYN | Facility: CLINIC | Age: 61
End: 2021-02-23

## 2021-02-23 VITALS
DIASTOLIC BLOOD PRESSURE: 80 MMHG | HEIGHT: 64 IN | WEIGHT: 144 LBS | SYSTOLIC BLOOD PRESSURE: 128 MMHG | BODY MASS INDEX: 24.59 KG/M2

## 2021-02-23 DIAGNOSIS — N94.9 UNSPECIFIED CONDITION ASSOCIATED WITH FEMALE GENITAL ORGANS AND MENSTRUAL CYCLE: ICD-10-CM

## 2021-02-23 DIAGNOSIS — R10.30 LOWER ABDOMINAL PAIN, UNSPECIFIED: ICD-10-CM

## 2021-02-23 PROCEDURE — 99072 ADDL SUPL MATRL&STAF TM PHE: CPT

## 2021-02-23 PROCEDURE — 99214 OFFICE O/P EST MOD 30 MIN: CPT

## 2021-02-23 NOTE — PHYSICAL EXAM
[Awake] : awake [Alert] : alert [Acute Distress] : acute distress [Soft] : soft [Tender] : non tender [Distended] : not distended [Oriented x3] : oriented to person, place, and time [Labia Minora] : labia minora [Labia Majora] : labia major [Normal] : clitoris [Atrophy] : no atrophy [Cystocele] : a cystocele [Discharge] : no discharge [Uterine Prolapse] : no uterine prolapse [No Bleeding] : there was no active vaginal bleeding [Uterine Adnexae] : were not tender and not enlarged [de-identified] : No masses. Patient is pointing normal sized lymph node to the right of her perineum. Non-tender on exam today. No hernia on standing. [FreeTextEntry4] : Grade 2 cystocele. Uterus well supported. Stool noted in rectum during vaginal exam.

## 2021-02-23 NOTE — HISTORY OF PRESENT ILLNESS
[1 Year Ago] : 1 year ago [Last Pap ___] : Last cervical pap smear was [unfilled] [Definite:  ___ (Date)] : the last menstrual period was [unfilled] [Menarche Age: ____] : age at menarche was [unfilled] [Sexually Active] : is sexually active [Male ___] : [unfilled] male

## 2021-03-10 ENCOUNTER — APPOINTMENT (OUTPATIENT)
Dept: PHYSICAL MEDICINE AND REHAB | Facility: CLINIC | Age: 61
End: 2021-03-10

## 2021-03-16 ENCOUNTER — APPOINTMENT (OUTPATIENT)
Dept: OBGYN | Facility: CLINIC | Age: 61
End: 2021-03-16

## 2021-03-28 ENCOUNTER — APPOINTMENT (OUTPATIENT)
Dept: DISASTER EMERGENCY | Facility: OTHER | Age: 61
End: 2021-03-28
Payer: MEDICAID

## 2021-03-28 PROCEDURE — 0011A: CPT

## 2021-04-25 ENCOUNTER — APPOINTMENT (OUTPATIENT)
Dept: DISASTER EMERGENCY | Facility: OTHER | Age: 61
End: 2021-04-25
Payer: MEDICAID

## 2021-04-25 PROCEDURE — 0012A: CPT

## 2021-05-03 ENCOUNTER — APPOINTMENT (OUTPATIENT)
Dept: OBGYN | Facility: CLINIC | Age: 61
End: 2021-05-03
Payer: MEDICAID

## 2021-05-03 VITALS
HEIGHT: 64 IN | BODY MASS INDEX: 24.59 KG/M2 | TEMPERATURE: 97.5 F | DIASTOLIC BLOOD PRESSURE: 84 MMHG | SYSTOLIC BLOOD PRESSURE: 130 MMHG | WEIGHT: 144 LBS

## 2021-05-03 PROCEDURE — 99396 PREV VISIT EST AGE 40-64: CPT

## 2021-05-03 PROCEDURE — 99072 ADDL SUPL MATRL&STAF TM PHE: CPT

## 2021-05-03 NOTE — HISTORY OF PRESENT ILLNESS
[N] : Patient reports normal menses [Y] : Positive pregnancy history [Menarche Age: ____] : age at menarche was [unfilled] [No] : Patient does not have concerns regarding sex [Currently Active] : currently active [Men] : men [Yes] : Yes [TextBox_4] : Pt presents for an annual exam [Mammogramdate] : 07/30/2020 [TextBox_19] : BR2 [BreastSonogramDate] : 07/30/2020 [TextBox_25] : BR2 [PapSmeardate] : 12/17/2019 [TextBox_31] : NORMAL [BoneDensityDate] : 2021 [HPVDate] : 12/17/2019 [TextBox_78] : NEG [LMPDate] : 2010 [de-identified] : tubal ligation [PGHxTotal] : 4 [[facility] [[facility] : 4 [FreeTextEntry1] : 2010 [FreeTextEntry3] : tubal ligation

## 2021-05-03 NOTE — DISCUSSION/SUMMARY
[FreeTextEntry1] : \par The benefits of adequate calcium intake and a daily multivitamin along with routine daily cardiovascular exercise were reviewed with the patient.\par \par Self-breast exam reviewed. \par \par Prescription for mammogram screening and breast US due in 07/2021 were given today.\par \par She will follow up annually and as needed.

## 2021-05-03 NOTE — PHYSICAL EXAM
[Alert] : alert [Appropriately responsive] : appropriately responsive [No Acute Distress] : no acute distress [Soft] : soft [Non-tender] : non-tender [Non-distended] : non-distended [Oriented x3] : oriented x3 [Examination Of The Breasts] : a normal appearance [No Discharge] : no discharge [No Masses] : no breast masses were palpable [Vulvar Atrophy] : vulvar atrophy [Labia Majora] : normal [Labia Minora] : normal [No Bleeding] : There was no active vaginal bleeding [Normal] : normal

## 2021-05-07 LAB
CYTOLOGY CVX/VAG DOC THIN PREP: ABNORMAL
HPV HIGH+LOW RISK DNA PNL CVX: NOT DETECTED

## 2021-12-13 ENCOUNTER — OUTPATIENT (OUTPATIENT)
Dept: OUTPATIENT SERVICES | Facility: HOSPITAL | Age: 61
LOS: 1 days | End: 2021-12-13
Payer: MEDICAID

## 2021-12-13 DIAGNOSIS — R20.0 ANESTHESIA OF SKIN: ICD-10-CM

## 2021-12-13 DIAGNOSIS — Z98.89 OTHER SPECIFIED POSTPROCEDURAL STATES: Chronic | ICD-10-CM

## 2021-12-13 DIAGNOSIS — Z98.890 OTHER SPECIFIED POSTPROCEDURAL STATES: Chronic | ICD-10-CM

## 2021-12-13 DIAGNOSIS — I49.3 VENTRICULAR PREMATURE DEPOLARIZATION: ICD-10-CM

## 2021-12-13 DIAGNOSIS — I65.29 OCCLUSION AND STENOSIS OF UNSPECIFIED CAROTID ARTERY: ICD-10-CM

## 2021-12-13 PROCEDURE — 93923 UPR/LXTR ART STDY 3+ LVLS: CPT

## 2021-12-13 PROCEDURE — 93923 UPR/LXTR ART STDY 3+ LVLS: CPT | Mod: 26

## 2022-01-01 NOTE — PATIENT PROFILE ADULT. - NS PRO AD NO ADVANCE DIRECTIVE
Well  at 2 Weeks    Development  Infants of this age can usually focus on faces or objects best at a distance of 8-10 inches. (The normal distance between a baby's eyes and mom's face when nursing). Babies will have crossed eyes when they are not focusing on objects. This typically continues until around 4 months-of-age when their visual acuity sharpens. Babies have daily fussy periods which may last from 1 to 4 hours, and are usually most pronounced at about 6 weeks. Sibling rivalry/jealousy should be expected, and special time should be allotted for the other children at home to give them the attention they may feel they are missing. Normal infant behavior includes frequent sneezing and hiccupping. These may last for 2-3 months. Infants need to suck their thumbs, fingers, or a pacifier for comfort. It is best to let babies have a pacifier because it can always be removed later. Pull the thumb or fingers out if they get a hold on them. It saves you from having an [de-identified] year-old who still sucks his thumb. Diet  Babies should be fed generally every 2 to 4 hours.  infants  may feed a bit more often than formula fed infants, but still should not eat more often than every 2 hours. typically spend 10 minutes on each breast during feeding, but this can be variable  A pacifier is handy if they want to eat more frequently than that. Babies should be held while they are feeding. It helps to foster bonding between the caregiver and the infant. It is not a good idea to prop the bottle:  it reduces bonding and increases the risk of ear infections. If feeding with formula, make sure that you are using an iron-fortified formula. Spitting small amounts after feeding is common. To minimize this, burp frequently and keep your child in an upright position for 15-30 minutes after feeding. When you lie your infant down, prop her on her side.   No juices, cereal or solid foods are recommended until 3months of age--no matter what grandma, great grandma, or great-great grandma says. Research over the past few years has shown that feeding such things before 4 months-of-age increases the risk of food allergies, obesity, or other problems, such as constipation and colic. Your doctor, however, may recommend one or more of these if needed, but only he/she can determine whether the risks of starting these foods too early outweighs the potential benefits. Do NOT give honey until one year-of-age. Babies can develop a form of fatal food poisoning called botulism from eating honey. Once they are one year-old, babies stomachs can kill the bacterial spores that cause botulism. Do not give water to the baby. It may result in electrolyte imbalances which may lead to seizures or death. If using formula, you may use tap water (if you have city water) or bottled water for preparation, but do not use well water without boiling it properly first.  All babies should get a vitamin D supplement, especially breast fed infants. Once a day for your infant and dose per package instructions (should be 400 IU/day) until 1 year of life if breast fed or until taking 30 oz of formula a day. D-drops are one brand, [employer] has a Vit D drop and there are other brands as well. You can find them in the baby aisle     Hygiene  Use a mild unscented soap such as [employer], Miriam Sep or Cetaphil for your baby's body. Wash the face with water only. New recommendations are to leave umbilical cord alone and dry. If you must, once a day with alcohol is fine but it's not needed. As the cord starts to detach, it may develop a yellow discharge or spots of blood. This is normal, just dab with a dry cloth as needed, but if a large amount of discharge or redness occurs, the baby needs to be checked out by her pediatrician. After the cord is detached and belly button is dry/appears normal, the baby may begin to take tub baths.   Unscented Baby lotion may be used on the skin if it is excessively dry, but avoid the face and scalp. Do not put Q-tips into the ear canal.  Wax will melt and collect at the opening to the ear canal.  This can be easily cleaned with safety Q-tips or a wash cloth. Sleep  Babies typically sleep for 16 hours a day. This lessens as they grow older, especially around 3-4 months-of-age. BABIES MUST SLEEP ON THEIR BACKS to reduce the risk of SIDS (sudden infant death syndrome). Other ways to reduce the risk of SIDS:  Use a pacifier during sleep time. Avoid allowing the baby to get overheated. Recommended room temperature is 68-72 degrees. Keep a season-appropriate sleeper or gown on the baby  No blankets in the crib   Babies may not sleep through the night for several more weeks or months. It is not a good idea to start cereal before 4 months-of-age without a good medical reason because of the risks associated (see above). This is despite what grandma may say. Bowel & Bladder Habits  Babies typically urinate six times a day  Bowel movements  often accompanied by grunting, turning red or apparent straining. This is not due to constipation, but the babys frustration at learning how to eliminate a bowel movement when the urge arises. Constipation = firm or hard stools, not several days between bowel movements  It is not uncommon for some babies to have bowel movements four times a day or every 4 or 5 days. As long as stools are soft, there is nothing to worry about. Safety  Never take your child in any car unless he is properly restrained in an infant car seat. The infant should continue to face rearward. Always restrain your baby in an appropriate infant car seat. (Besides being common sense, IT'S THE LAW!). Remember this applies to when riding in someone else's car. Infants may roll over or scoot long before they will truly master these skills.  Never leave your infant on a surface (including a bed) from which he could fall. All it takes is one good kick and a baby may roll enough to tumble off any elevated surface. It is very important to NOT smoke around babies. Their lungs are small and are still developing. Babies exposed to cigarette smoke are frequently more ill than infants not exposed. Cigarette smoke also sharply raises the risk of developing ear infections. Smoking must occur outside. Smoking in another room with the door closed (even with a vent fan) does not help. When smoking outside, wear an extra jacket or shirt. Take this shirt off once back in the house, especially before picking up the baby. Smoke that has absorbed into clothing will be breathed in by the baby and is just as harmful as smoke traveling through the air. Crib slats should be no more than 2 3/8 inches apart. Make sure that the crib rails are up at all times when the baby is in the crib. There should be nothing in the crib except the baby and a light blanket. This includes a bumper pad. Any extra item in the bed poses a potential suffocation risk. Once the baby has developed enough strength to roll over both ways and lift his head for long periods of time, these items may be returned to the bed. Toys on the side slats are okay as long as they are firmly secured. Never leave your baby unattended in the tub, even for an instant! Never eat, drink, or carry anything hot near your baby. To protect your child from scalds, reduce the temperature of your hot water heater to 120 oF; avoid holding your infant while cooking, smoking, or drinking hot liquids. Do not put an infant seat on anything but the floor when the baby is in the seat. Never use a pacifier on a string or put any strings or ribbons in the crib. Install smoke alarms on every floor and check batteries monthly. Never jiggle or shake the baby too vigorously. This may result in head and brain injuries.     Illness  Fever = 100.4 degrees or higher rectally  If an infant less than 3months of age develops a fever, it is important to call us right away. For this reason, it is important to have a rectal thermometer available. No tylenol less than 3months of age. Motrin/Ibuprofen is not safe until 6 months. Other signs of illness:  Irritability for no identifiable reason  Lethargy or difficulty waking the baby up  Very poor feeding  If your baby develops any other symptoms that you think indicate illness, please call the office and arrange for us to see her. Stimulation  Infants like to look at faces (especially eyes) and colors (reds, yellows, and black / white contrasts). If it is possible, both mother and father should be actively involved in caring for the baby. Babies love to suck their thumb or a pacifier. Remember, a pacifier can be taken away, but a thumb cannot. Babies also love to be sung and talked to while being cuddled. It is not too early to start reading to your child. Toys  Mobiles, bells, hanging unbreakable mirrors, music boxes are all good ideas but must be well out of reach. Newborns will give close attention to figures which more closely resemble the human face. We are committed to providing you with the best care possible. In order to help us achieve these goals please remember to bring all medications, herbal products, and over the counter supplements with you to each visit. If your provider has ordered testing for you, please be sure to follow up with our office if you have not received results within 7 days after the testing took place. *If you receive a survey after visiting one of our offices, please take time to share your experience concerning your physician office visit. These surveys are confidential and no health information about you is shared. We are eager to improve for you and we are counting on your feedback to help make that happen. No

## 2022-07-09 ENCOUNTER — TELEPHONE ENCOUNTER (OUTPATIENT)
Dept: URBAN - METROPOLITAN AREA CLINIC 121 | Facility: CLINIC | Age: 62
End: 2022-07-09

## 2022-07-10 ENCOUNTER — TELEPHONE ENCOUNTER (OUTPATIENT)
Dept: URBAN - METROPOLITAN AREA CLINIC 121 | Facility: CLINIC | Age: 62
End: 2022-07-10

## 2022-07-10 RX ORDER — DEXTROAMPHETAMINE SACCHARATE, AMPHETAMINE ASPARTATE, DEXTROAMPHETAMINE SULFATE, AND AMPHETAMINE SULFATE 7.5; 7.5; 7.5; 7.5 MG/1; MG/1; MG/1; MG/1
TABLET ORAL AS NEEDED
Refills: 0 | Status: ACTIVE | COMMUNITY
Start: 2009-01-22

## 2022-07-10 RX ORDER — FLUTICASONE PROPIONATE AND SALMETEROL XINAFOATE 115; 21 UG/1; UG/1
AEROSOL, METERED RESPIRATORY (INHALATION) ONCE A DAY
Refills: 0 | Status: ACTIVE | COMMUNITY
Start: 2009-01-22

## 2022-07-10 RX ORDER — MONTELUKAST SODIUM 10 MG/1
TABLET, FILM COATED ORAL
Refills: 0 | Status: ACTIVE | COMMUNITY
Start: 2009-01-22

## 2022-10-17 ENCOUNTER — LABORATORY RESULT (OUTPATIENT)
Age: 62
End: 2022-10-17

## 2022-10-17 ENCOUNTER — APPOINTMENT (OUTPATIENT)
Dept: PULMONOLOGY | Facility: CLINIC | Age: 62
End: 2022-10-17

## 2022-10-17 VITALS
WEIGHT: 134 LBS | BODY MASS INDEX: 22.88 KG/M2 | DIASTOLIC BLOOD PRESSURE: 80 MMHG | SYSTOLIC BLOOD PRESSURE: 126 MMHG | OXYGEN SATURATION: 98 % | HEART RATE: 60 BPM | RESPIRATION RATE: 16 BRPM | HEIGHT: 64 IN

## 2022-10-17 PROCEDURE — 99204 OFFICE O/P NEW MOD 45 MIN: CPT

## 2022-10-17 RX ORDER — CYCLOBENZAPRINE HYDROCHLORIDE 10 MG/1
10 TABLET, FILM COATED ORAL
Qty: 30 | Refills: 0 | Status: DISCONTINUED | COMMUNITY
Start: 2022-08-29 | End: 2022-10-17

## 2022-10-17 RX ORDER — MELOXICAM 15 MG/1
15 TABLET ORAL
Qty: 90 | Refills: 0 | Status: DISCONTINUED | COMMUNITY
Start: 2022-08-15 | End: 2022-10-17

## 2022-10-17 RX ORDER — AMOXICILLIN AND CLAVULANATE POTASSIUM 875; 125 MG/1; MG/1
875-125 TABLET, COATED ORAL
Qty: 20 | Refills: 0 | Status: DISCONTINUED | COMMUNITY
Start: 2022-04-18 | End: 2022-10-17

## 2022-10-17 NOTE — HISTORY OF PRESENT ILLNESS
[TextBox_4] : The patient is a 62-year-old female who comes for evaluation of several lung nodules.  She had a chest x-ray routinely which showed possible right upper lobe nodule.  A CT of the chest was performed last month demonstrating several small nodules, all less than 4 mm in size.  She smoked in college but not since.  She has a history of asthma for much of her life.  Currently she is on albuterol and budesonide, and she has been complaining of increasing shortness of breath lately with the need for albuterol several times daily.  She could not tolerate Breo but did well on Advair in the past.  Her asthma is worse this time of year.  She has heartburn and some nasal stuffiness at times.  She has been on montelukast in the past.  She still has that at home.

## 2022-10-17 NOTE — ASSESSMENT
[FreeTextEntry1] : The patient has several tiny lung nodules and is a low risk patient.  These do not need to be followed.\par \par Patient's asthma of unclear severity.  An asthma profile to look for allergies, and pulmonary function studies have been scheduled.  Follow-up in 2 months to review those.  In the meantime she will continue budesonide and albuterol.  I advised her to restart montelukast as well.

## 2022-10-17 NOTE — CONSULT LETTER
[Dear  ___] : Dear  [unfilled], [Consult Letter:] : I had the pleasure of evaluating your patient, [unfilled]. [Please see my note below.] : Please see my note below. [Consult Closing:] : Thank you very much for allowing me to participate in the care of this patient.  If you have any questions, please do not hesitate to contact me. [Sincerely,] : Sincerely, [FreeTextEntry3] : Katie Shirley MD FCCP\par D-ABSM\par ABIM board certified in  Pulmonary diseases, Sleep medicine\par Internal medicine\par \par [facility] [place]\par

## 2022-10-19 LAB
A ALTERNATA IGE QN: <0.1 KUA/L
A FUMIGATUS IGE QN: <0.1 KUA/L
BASOPHILS # BLD AUTO: 0.03 K/UL
BASOPHILS NFR BLD AUTO: 0.5 %
C ALBICANS IGE QN: <0.1 KUA/L
C HERBARUM IGE QN: <0.1 KUA/L
CAT DANDER IGE QN: 1.95 KUA/L
COMMON RAGWEED IGE QN: 1.17 KUA/L
D FARINAE IGE QN: 0.17 KUA/L
D PTERONYSS IGE QN: 0.63 KUA/L
DEPRECATED A ALTERNATA IGE RAST QL: 0
DEPRECATED A FUMIGATUS IGE RAST QL: 0
DEPRECATED C ALBICANS IGE RAST QL: 0
DEPRECATED C HERBARUM IGE RAST QL: 0
DEPRECATED CAT DANDER IGE RAST QL: 2
DEPRECATED COMMON RAGWEED IGE RAST QL: 2
DEPRECATED D FARINAE IGE RAST QL: NORMAL
DEPRECATED D PTERONYSS IGE RAST QL: 1
DEPRECATED DOG DANDER IGE RAST QL: 3
DEPRECATED M RACEMOSUS IGE RAST QL: 0
DEPRECATED ROACH IGE RAST QL: 0
DEPRECATED TIMOTHY IGE RAST QL: 2
DEPRECATED WHITE OAK IGE RAST QL: 2
DOG DANDER IGE QN: 14.2 KUA/L
EOSINOPHIL # BLD AUTO: 0.29 K/UL
EOSINOPHIL NFR BLD AUTO: 4.4 %
HCT VFR BLD CALC: 42.2 %
HGB BLD-MCNC: 13.2 G/DL
IMM GRANULOCYTES NFR BLD AUTO: 0.3 %
LYMPHOCYTES # BLD AUTO: 1.73 K/UL
LYMPHOCYTES NFR BLD AUTO: 26.5 %
M RACEMOSUS IGE QN: <0.1 KUA/L
MAN DIFF?: NORMAL
MCHC RBC-ENTMCNC: 29.9 PG
MCHC RBC-ENTMCNC: 31.3 GM/DL
MCV RBC AUTO: 95.5 FL
MONOCYTES # BLD AUTO: 0.63 K/UL
MONOCYTES NFR BLD AUTO: 9.7 %
NEUTROPHILS # BLD AUTO: 3.82 K/UL
NEUTROPHILS NFR BLD AUTO: 58.6 %
PLATELET # BLD AUTO: 294 K/UL
RBC # BLD: 4.42 M/UL
RBC # FLD: 12.3 %
ROACH IGE QN: <0.1 KUA/L
TIMOTHY IGE QN: 1.34 KUA/L
WBC # FLD AUTO: 6.52 K/UL
WHITE OAK IGE QN: 1.72 KUA/L

## 2022-11-01 ENCOUNTER — RESULT REVIEW (OUTPATIENT)
Age: 62
End: 2022-11-01

## 2022-11-01 ENCOUNTER — APPOINTMENT (OUTPATIENT)
Dept: OBGYN | Facility: CLINIC | Age: 62
End: 2022-11-01

## 2022-11-01 ENCOUNTER — NON-APPOINTMENT (OUTPATIENT)
Age: 62
End: 2022-11-01

## 2022-11-01 VITALS
HEIGHT: 64 IN | BODY MASS INDEX: 23.39 KG/M2 | DIASTOLIC BLOOD PRESSURE: 74 MMHG | WEIGHT: 137 LBS | SYSTOLIC BLOOD PRESSURE: 130 MMHG

## 2022-11-01 PROCEDURE — 99396 PREV VISIT EST AGE 40-64: CPT

## 2022-11-01 RX ORDER — CHLORHEXIDINE GLUCONATE, 0.12% ORAL RINSE 1.2 MG/ML
0.12 SOLUTION DENTAL
Qty: 473 | Refills: 0 | Status: DISCONTINUED | COMMUNITY
Start: 2022-07-05 | End: 2022-11-01

## 2022-11-01 RX ORDER — ALBUTEROL SULFATE 90 UG/1
108 (90 BASE) INHALANT RESPIRATORY (INHALATION)
Qty: 8 | Refills: 0 | Status: DISCONTINUED | COMMUNITY
Start: 2022-05-16 | End: 2022-11-01

## 2022-11-02 LAB — HPV HIGH+LOW RISK DNA PNL CVX: NOT DETECTED

## 2022-11-08 LAB — CYTOLOGY CVX/VAG DOC THIN PREP: ABNORMAL

## 2022-11-16 ENCOUNTER — APPOINTMENT (OUTPATIENT)
Dept: COLORECTAL SURGERY | Facility: CLINIC | Age: 62
End: 2022-11-16

## 2022-11-16 PROCEDURE — 99441: CPT

## 2022-11-16 NOTE — REASON FOR VISIT
[Home] : at home, [unfilled] , at the time of the visit. [Medical Office: ([facility])___] : at the medical office located in  [Verbal consent obtained from patient] : the patient, [unfilled]

## 2022-11-16 NOTE — HISTORY OF PRESENT ILLNESS
[FreeTextEntry1] : Ms. Lovelace presents telephonically to discuss colonoscopy.  Last colonoscopy was noted to be approximately 4 years earlier and was by report, unremarkable.  In the interim, she denies any abdominal pain or rectal bleeding though notes there has been increased difficulties in evacuating her stools despite adherence to a healthy bowel regimen.  Bowel movements are otherwise passed on a daily basis.  No known family history of colon or rectal cancer.

## 2022-11-16 NOTE — ASSESSMENT
[FreeTextEntry1] : Ms. Lovelace presents to the office for discussion of a screening colonoscopy. The risks/benefits/alternatives for a colonoscopy were discussed. These include a less than 1% risk of bleeding should any polyps be biopsied and/or removed. There is also a less than 0.1% risk of perforation. The patient understands the need to adhere to a clear liquid diet the day prior to procedure as well as having to perform a bowel prep in order to allow for adequate visualization of the mucosal surfaces.  Followup colonoscopies will be scheduled based on the findings that are seen at the time of the procedure. Patient understands and is agreeable, and will proceed with consent and scheduling.\par

## 2022-11-30 NOTE — BEGINNING OF VISIT
[Patient] : patient Yes... Yes... Yes... Yes... Yes... Yes... Yes... Yes... Yes... Yes... Yes... Yes... Yes... Yes... Yes... Yes... Yes... Yes... Yes... Yes...

## 2022-12-07 ENCOUNTER — APPOINTMENT (OUTPATIENT)
Dept: MAMMOGRAPHY | Facility: CLINIC | Age: 62
End: 2022-12-07

## 2022-12-07 ENCOUNTER — RESULT REVIEW (OUTPATIENT)
Age: 62
End: 2022-12-07

## 2022-12-07 ENCOUNTER — APPOINTMENT (OUTPATIENT)
Dept: ULTRASOUND IMAGING | Facility: CLINIC | Age: 62
End: 2022-12-07

## 2022-12-07 ENCOUNTER — OUTPATIENT (OUTPATIENT)
Dept: OUTPATIENT SERVICES | Facility: HOSPITAL | Age: 62
LOS: 1 days | End: 2022-12-07
Payer: MEDICAID

## 2022-12-07 DIAGNOSIS — N63.20 UNSPECIFIED LUMP IN THE LEFT BREAST, UNSPECIFIED QUADRANT: ICD-10-CM

## 2022-12-07 DIAGNOSIS — Z12.31 ENCOUNTER FOR SCREENING MAMMOGRAM FOR MALIGNANT NEOPLASM OF BREAST: ICD-10-CM

## 2022-12-07 DIAGNOSIS — Z98.890 OTHER SPECIFIED POSTPROCEDURAL STATES: Chronic | ICD-10-CM

## 2022-12-07 DIAGNOSIS — Z98.89 OTHER SPECIFIED POSTPROCEDURAL STATES: Chronic | ICD-10-CM

## 2022-12-07 PROCEDURE — G0279: CPT

## 2022-12-07 PROCEDURE — 77066 DX MAMMO INCL CAD BI: CPT | Mod: 26

## 2022-12-07 PROCEDURE — 76641 ULTRASOUND BREAST COMPLETE: CPT | Mod: 26,50

## 2022-12-07 PROCEDURE — G0279: CPT | Mod: 26

## 2022-12-07 PROCEDURE — 76641 ULTRASOUND BREAST COMPLETE: CPT

## 2022-12-07 PROCEDURE — 77066 DX MAMMO INCL CAD BI: CPT

## 2022-12-12 ENCOUNTER — RESULT CHARGE (OUTPATIENT)
Age: 62
End: 2022-12-12

## 2022-12-13 DIAGNOSIS — Z01.818 ENCOUNTER FOR OTHER PREPROCEDURAL EXAMINATION: ICD-10-CM

## 2022-12-16 NOTE — HISTORY OF PRESENT ILLNESS
[HPV test offered] : HPV test offered [LMP unknown] : LMP unknown [N] : Patient reports normal menses [unknown] : Patient is unsure of the date of her LMP [Menarche Age: ____] : age at menarche was [unfilled] [Previously active] : previously active [Men] : men [Y] : Patient uses contraception [No] : Patient does not have concerns regarding sex [TextBox_4] : Esperanza is here for an annual exam.  She reports she is being seen for unidentified lung nodules- still having a workup completed.\par \par She denies gyn complaints at this time. She will occasionally have some breast discomfort. she denies feeling any breast lumps. [Mammogramdate] : 07/30/2020 [TextBox_19] : BR2 [BreastSonogramDate] : 07/30/2020 [TextBox_25] : BR2 [PapSmeardate] : 05/03/2021 [TextBox_31] : Negative [HPVDate] : 05/03/2021 [TextBox_78] : Negative [de-identified] : had a Tubal Ligation [PGxTotal] : 6 [[facility]] : 4 [PGHxAbortions] : 2 [[facility]] : 4

## 2022-12-16 NOTE — PLAN
[FreeTextEntry1] : -Pt was recommended to have a diagnostic mammogram and sonogram to further evaluate the palpable nodule in the left breast. We discussed potential outcomes of imaging including benign findings, findings which require close interval follow up, suspicious findings which require biopsy, and also the possibility the imaging will not identify the nodule.  We discussed the need for breast surgeon follow up pending results of the imaging. \par -pap obtained- f/u results\par -colonoscopy due- referral info given.\par \par \par

## 2022-12-16 NOTE — PHYSICAL EXAM
[Chaperone Present] : A chaperone was present in the examining room during all aspects of the physical examination [Appropriately responsive] : appropriately responsive [Alert] : alert [No Acute Distress] : no acute distress [Soft] : soft [Non-tender] : non-tender [Non-distended] : non-distended [No Lesions] : no lesions [No Mass] : no mass [Oriented x3] : oriented x3 [Examination Of The Breasts] : a normal appearance [No Masses] : no breast masses were palpable [Absent] : absent [Normal] : normal [Uterine Adnexae] : normal [FreeTextEntry1] : NALINI Hernandez [Tenderness] : nontender [Enlarged ___ wks] : not enlarged [Mass ___ cm] : no uterine mass was palpated

## 2023-01-09 NOTE — ED ADULT TRIAGE NOTE - BSA (M2)
Received notice patient had not read her my chart last results  Called and left message for patient to call 1.69

## 2023-01-24 ENCOUNTER — APPOINTMENT (OUTPATIENT)
Dept: COLORECTAL SURGERY | Facility: HOSPITAL | Age: 63
End: 2023-01-24
Payer: MEDICAID

## 2023-01-24 ENCOUNTER — OUTPATIENT (OUTPATIENT)
Dept: OUTPATIENT SERVICES | Facility: HOSPITAL | Age: 63
LOS: 1 days | Discharge: HOME | End: 2023-01-24

## 2023-01-24 VITALS
RESPIRATION RATE: 16 BRPM | HEART RATE: 77 BPM | SYSTOLIC BLOOD PRESSURE: 135 MMHG | DIASTOLIC BLOOD PRESSURE: 82 MMHG | TEMPERATURE: 98 F | OXYGEN SATURATION: 100 % | HEIGHT: 64 IN | WEIGHT: 134.04 LBS

## 2023-01-24 DIAGNOSIS — K59.00 CONSTIPATION, UNSPECIFIED: ICD-10-CM

## 2023-01-24 DIAGNOSIS — Z98.890 OTHER SPECIFIED POSTPROCEDURAL STATES: Chronic | ICD-10-CM

## 2023-01-24 DIAGNOSIS — Z98.89 OTHER SPECIFIED POSTPROCEDURAL STATES: Chronic | ICD-10-CM

## 2023-01-24 PROCEDURE — 45378 DIAGNOSTIC COLONOSCOPY: CPT

## 2023-01-24 RX ORDER — ALBUTEROL 90 UG/1
0 AEROSOL, METERED ORAL
Qty: 0 | Refills: 0 | DISCHARGE

## 2023-01-24 RX ORDER — BUDESONIDE 32 UG/1
0 SPRAY, METERED NASAL
Qty: 0 | Refills: 0 | DISCHARGE

## 2023-01-29 DIAGNOSIS — J45.909 UNSPECIFIED ASTHMA, UNCOMPLICATED: ICD-10-CM

## 2023-01-29 DIAGNOSIS — K57.30 DIVERTICULOSIS OF LARGE INTESTINE WITHOUT PERFORATION OR ABSCESS WITHOUT BLEEDING: ICD-10-CM

## 2023-01-29 DIAGNOSIS — R19.4 CHANGE IN BOWEL HABIT: ICD-10-CM

## 2023-01-29 DIAGNOSIS — K21.9 GASTRO-ESOPHAGEAL REFLUX DISEASE WITHOUT ESOPHAGITIS: ICD-10-CM

## 2023-02-07 ENCOUNTER — APPOINTMENT (OUTPATIENT)
Dept: BREAST CENTER | Facility: CLINIC | Age: 63
End: 2023-02-07
Payer: MEDICAID

## 2023-02-07 VITALS
HEART RATE: 87 BPM | DIASTOLIC BLOOD PRESSURE: 78 MMHG | WEIGHT: 131 LBS | HEIGHT: 64 IN | BODY MASS INDEX: 22.36 KG/M2 | SYSTOLIC BLOOD PRESSURE: 128 MMHG

## 2023-02-07 DIAGNOSIS — Z87.09 PERSONAL HISTORY OF OTHER DISEASES OF THE RESPIRATORY SYSTEM: ICD-10-CM

## 2023-02-07 DIAGNOSIS — Z87.39 PERSONAL HISTORY OF OTHER DISEASES OF THE MUSCULOSKELETAL SYSTEM AND CONNECTIVE TISSUE: ICD-10-CM

## 2023-02-07 DIAGNOSIS — N63.20 UNSPECIFIED LUMP IN THE LEFT BREAST, UNSPECIFIED QUADRANT: ICD-10-CM

## 2023-02-07 DIAGNOSIS — Z80.3 FAMILY HISTORY OF MALIGNANT NEOPLASM OF BREAST: ICD-10-CM

## 2023-02-07 DIAGNOSIS — Z87.19 PERSONAL HISTORY OF OTHER DISEASES OF THE DIGESTIVE SYSTEM: ICD-10-CM

## 2023-02-07 PROCEDURE — 99204 OFFICE O/P NEW MOD 45 MIN: CPT | Mod: 25

## 2023-02-07 PROCEDURE — 76642 ULTRASOUND BREAST LIMITED: CPT

## 2023-02-07 RX ORDER — BUDESONIDE AND FORMOTEROL FUMARATE DIHYDRATE 160; 4.5 UG/1; UG/1
AEROSOL RESPIRATORY (INHALATION)
Refills: 0 | Status: DISCONTINUED | COMMUNITY
End: 2023-02-07

## 2023-02-07 RX ORDER — ERGOCALCIFEROL 1.25 MG/1
1.25 MG CAPSULE, LIQUID FILLED ORAL
Qty: 12 | Refills: 0 | Status: DISCONTINUED | COMMUNITY
Start: 2022-08-29 | End: 2023-02-07

## 2023-02-07 RX ORDER — DIAZEPAM 2 MG/1
2 TABLET ORAL
Qty: 30 | Refills: 0 | Status: DISCONTINUED | COMMUNITY
Start: 2022-08-23 | End: 2023-02-07

## 2023-02-07 RX ORDER — CHLORHEXIDINE GLUCONATE 4 %
1000 LIQUID (ML) TOPICAL
Qty: 90 | Refills: 0 | Status: DISCONTINUED | COMMUNITY
Start: 2022-08-29 | End: 2023-02-07

## 2023-02-07 RX ORDER — BUDESONIDE 9 MG/1
TABLET, EXTENDED RELEASE ORAL
Refills: 0 | Status: ACTIVE | COMMUNITY

## 2023-02-07 NOTE — DATA REVIEWED
[FreeTextEntry1] : B/l mammogram and US 12/7/22\par - scattered fibroglandular density\par - no abnormality at area of concern in L breast 9:00 N5\par - BIRADS 1

## 2023-02-07 NOTE — PHYSICAL EXAM
[Normocephalic] : normocephalic [Atraumatic] : atraumatic [Sclera nonicteric] : sclera nonicteric [Supple] : supple [No Supraclavicular Adenopathy] : no supraclavicular adenopathy [No Cervical Adenopathy] : no cervical adenopathy [Clear to Auscultation Bilat] : clear to auscultation bilaterally [Normal Sinus Rhythm] : normal sinus rhythm [No Rubs] : no pericardial rub [Examined in the supine and seated position] : examined in the supine and seated position [Bra Size: ___] : Bra Size: [unfilled] [No dominant masses] : no dominant masses in right breast  [No dominant masses] : no dominant masses left breast [No Nipple Retraction] : no left nipple retraction [No Nipple Discharge] : no left nipple discharge [Soft] : abdomen soft [No Edema] : no edema [No Rashes] : no rashes [No Ulceration] : no ulceration [de-identified] : Palpable node [de-identified] : Palpable node

## 2023-02-07 NOTE — PROCEDURE
[FreeTextEntry1] : Targeted L and R axillary US [FreeTextEntry2] : palpable nodes [FreeTextEntry3] : The patient was placed in a supine position, and the left and right axilla were scanned in both transverse and sagittal orientations. Benign lymph nodes with preserved hilum were seen on both sides. My impression is that this is reactive nodes, BIRADS 1, and observation is recommended.

## 2023-02-07 NOTE — CONSULT LETTER
[Dear  ___] : Dear ~JHON, [Consult Letter:] : I had the pleasure of evaluating your patient, [unfilled]. [Please see my note below.] : Please see my note below. [Consult Closing:] : Thank you very much for allowing me to participate in the care of this patient.  If you have any questions, please do not hesitate to contact me. [Sincerely,] : Sincerely, [FreeTextEntry3] : Robyn Mccracken MD FACS  [DrBrent  ___] : Dr. HEATH

## 2023-02-07 NOTE — HISTORY OF PRESENT ILLNESS
[FreeTextEntry1] : Ms. Lovelace is a 62 year old woman who presents for a consultation for a left breast lump. Her gynecologist noticed a lump in her left breast although the patient herself does not feel anything. No nipple discharge or skin changes. \par \par Her family history is significant for breast cancer in a paternal aunt in her 50's.

## 2023-02-07 NOTE — PAST MEDICAL HISTORY
[Menarche Age ____] : age at menarche was [unfilled] [Live Births ___] : P[unfilled]  [Age At Live Birth ___] : Age at live birth: [unfilled] [Menopause Age____] : age at menopause was [unfilled] [History of Hormone Replacement Treatment] : has no history of hormone replacement treatment [Total Preg ___] : G[unfilled] [FreeTextEntry7] : No [FreeTextEntry8] : No.

## 2023-03-03 ENCOUNTER — APPOINTMENT (OUTPATIENT)
Dept: OTOLARYNGOLOGY | Facility: CLINIC | Age: 63
End: 2023-03-03
Payer: MEDICAID

## 2023-03-03 VITALS
SYSTOLIC BLOOD PRESSURE: 157 MMHG | BODY MASS INDEX: 22.36 KG/M2 | HEIGHT: 64 IN | TEMPERATURE: 98.2 F | WEIGHT: 131 LBS | DIASTOLIC BLOOD PRESSURE: 77 MMHG | HEART RATE: 64 BPM

## 2023-03-03 DIAGNOSIS — J34.89 OTHER SPECIFIED DISORDERS OF NOSE AND NASAL SINUSES: ICD-10-CM

## 2023-03-03 DIAGNOSIS — M26.609 UNSPECIFIED TEMPOROMANDIBULAR JOINT DISORDER: ICD-10-CM

## 2023-03-03 DIAGNOSIS — H93.8X3 OTHER SPECIFIED DISORDERS OF EAR, BILATERAL: ICD-10-CM

## 2023-03-03 DIAGNOSIS — R09.89 OTHER SPECIFIED SYMPTOMS AND SIGNS INVOLVING THE CIRCULATORY AND RESPIRATORY SYSTEMS: ICD-10-CM

## 2023-03-03 DIAGNOSIS — H92.03 OTALGIA, BILATERAL: ICD-10-CM

## 2023-03-03 DIAGNOSIS — R42 DIZZINESS AND GIDDINESS: ICD-10-CM

## 2023-03-03 PROCEDURE — 31231 NASAL ENDOSCOPY DX: CPT

## 2023-03-03 PROCEDURE — 99204 OFFICE O/P NEW MOD 45 MIN: CPT | Mod: 25

## 2023-03-03 RX ORDER — MOMETASONE FUROATE MONOHYDRATE 50 UG/1
50 SPRAY, METERED NASAL
Refills: 0 | Status: ACTIVE | COMMUNITY

## 2023-03-03 RX ORDER — CETIRIZINE HCL 10 MG
TABLET ORAL
Refills: 0 | Status: ACTIVE | COMMUNITY

## 2023-03-03 RX ORDER — AZITHROMYCIN 250 MG/1
250 TABLET, FILM COATED ORAL
Refills: 0 | Status: COMPLETED | COMMUNITY
End: 2023-03-03

## 2023-03-03 RX ORDER — METHOTREXATE 2.5 MG/1
TABLET ORAL
Refills: 0 | Status: COMPLETED | COMMUNITY
End: 2023-03-03

## 2023-03-03 RX ORDER — METHYLPREDNISOLONE 4 MG/1
4 TABLET ORAL
Refills: 0 | Status: COMPLETED | COMMUNITY
End: 2023-03-03

## 2023-03-04 PROBLEM — M26.609 TMJ (TEMPOROMANDIBULAR JOINT DISORDER): Status: ACTIVE | Noted: 2023-03-04

## 2023-03-04 NOTE — HISTORY OF PRESENT ILLNESS
[de-identified] : 62 year old female referred by Dr Cannon for sinus infection. States went to urgent care in February for sinus infection, and dizziness was given Medrol pack and antibiotic with slight relief. States still feels dizzy, bilateral ear pressure, bilateral otalgia, had slight bleeding after she cleaned her ears, sinus pressure and pain, and  anterior rhinorrhea. \par Currently taking Zyrtec D , Mucinex and Nasonex as needed. Denies nasal congestion, post nasal drip or  poor sense of smell. Grinds and clenches teeth.

## 2023-03-04 NOTE — CONSULT LETTER
[Consult Letter:] : I had the pleasure of evaluating your patient, [unfilled]. [Please see my note below.] : Please see my note below. [Consult Closing:] : Thank you very much for allowing me to participate in the care of this patient.  If you have any questions, please do not hesitate to contact me. [Sincerely,] : Sincerely, [FreeTextEntry2] : Dr Cannon  [FreeTextEntry3] : Hugh Florence MD, LISA\par Otolaryngology \par Sinus and Endoscopic Skull Base Surgery \par Head and Neck Surgery\par \par [address], [address]\[place] [address]\par \par [address],\[place] [address]\par \par Tel: [phone]\par Fax:[phone]

## 2023-03-06 ENCOUNTER — APPOINTMENT (OUTPATIENT)
Dept: PULMONOLOGY | Facility: CLINIC | Age: 63
End: 2023-03-06
Payer: MEDICAID

## 2023-03-06 VITALS — DIASTOLIC BLOOD PRESSURE: 70 MMHG | SYSTOLIC BLOOD PRESSURE: 140 MMHG | HEART RATE: 71 BPM | OXYGEN SATURATION: 98 %

## 2023-03-06 VITALS — WEIGHT: 130 LBS | HEIGHT: 63 IN | BODY MASS INDEX: 23.04 KG/M2

## 2023-03-06 DIAGNOSIS — J45.909 UNSPECIFIED ASTHMA, UNCOMPLICATED: ICD-10-CM

## 2023-03-06 DIAGNOSIS — R93.89 ABNORMAL FINDINGS ON DIAGNOSTIC IMAGING OF OTHER SPECIFIED BODY STRUCTURES: ICD-10-CM

## 2023-03-06 PROCEDURE — 94729 DIFFUSING CAPACITY: CPT

## 2023-03-06 PROCEDURE — 94010 BREATHING CAPACITY TEST: CPT

## 2023-03-06 PROCEDURE — 85018 HEMOGLOBIN: CPT | Mod: QW

## 2023-03-06 PROCEDURE — 99214 OFFICE O/P EST MOD 30 MIN: CPT | Mod: 25

## 2023-03-06 PROCEDURE — 94727 GAS DIL/WSHOT DETER LNG VOL: CPT

## 2023-03-06 NOTE — PROCEDURE
[FreeTextEntry1] : The patient had pulmonary function studies which are normal.  Hemoglobin is 14.7.

## 2023-03-06 NOTE — ASSESSMENT
[FreeTextEntry1] : Patient with asthma that is at worst mild persistent.  She can continue on budesonide with albuterol as needed.  She can take Zyrtec for her allergies.\par \par Her asthma profile shows some environmental allergies but generally within normal IgE level and no eosinophilia.  No indication for biologicals at this time.\par \par Follow-up will be as needed.

## 2023-03-06 NOTE — HISTORY OF PRESENT ILLNESS
[TextBox_4] : Patient has been feeling well.  Using budesonide and albuterol.  Some recent upper respiratory virus in December which led to some increased asthma symptoms but currently doing well.  Patient came in for pulmonary function studies and to review her allergy profiles.

## 2023-03-22 ENCOUNTER — APPOINTMENT (OUTPATIENT)
Dept: OBGYN | Facility: CLINIC | Age: 63
End: 2023-03-22
Payer: MEDICAID

## 2023-03-22 VITALS
WEIGHT: 130 LBS | HEIGHT: 63 IN | DIASTOLIC BLOOD PRESSURE: 98 MMHG | SYSTOLIC BLOOD PRESSURE: 159 MMHG | BODY MASS INDEX: 23.04 KG/M2

## 2023-03-22 DIAGNOSIS — N89.8 OTHER SPECIFIED NONINFLAMMATORY DISORDERS OF VAGINA: ICD-10-CM

## 2023-03-22 PROCEDURE — 99213 OFFICE O/P EST LOW 20 MIN: CPT

## 2023-03-22 NOTE — HISTORY OF PRESENT ILLNESS
[HPV test offered] : HPV test offered [LMP unknown] : LMP unknown [postmenopausal] : postmenopausal [Y] : Positive pregnancy history [unknown] : Patient is unsure of the date of her LMP [Menarche Age: ____] : age at menarche was [unfilled] [Previously active] : previously active [Men] : men [TextBox_4] : 62y female presents to the office with abnormal vaginal discharge. pt was recently on 3 different antibiotics for a sinus infection, 2 weeks ago she noticed green vaginal discharge with mild odor with "a decreased urine steam". denies any vaginal itching, burning, abdominal pain, fevers, vaginal bleeding. She is not sexually active. pt used Monistat march 17th-19th with improved symptoms. Pt no longer has abnormal vaginal discharge, odor or urinary symptoms. however, wanted to be seen as she does not think green vaginal discharge was associated with yeast. Pt otherwise well appearing, and has no complaints. [Mammogramdate] : 12/07/22 [TextBox_19] : BR1 [BreastSonogramDate] : 12/07/22 [TextBox_25] : BR1 [PapSmeardate] : 11/01/22 [HPVDate] : 11/01/22 [TextBox_31] : Negative [TextBox_78] : Negative [de-identified] : had a Tubal Ligation [PGxTotal] : 6 [[facility]Full[facility]] : 4 [PGHxAbortions] : 2 [[facility]] : 4

## 2023-03-22 NOTE — PHYSICAL EXAM
[Chaperone Present] : A chaperone was present in the examining room during all aspects of the physical examination [Appropriately responsive] : appropriately responsive [Alert] : alert [No Acute Distress] : no acute distress [Soft] : soft [Non-tender] : non-tender [Oriented x3] : oriented x3 [Vulvar Atrophy] : vulvar atrophy [Labia Majora] : normal [Labia Minora] : normal [Atrophy] : atrophy [Discharge] : a  ~M vaginal discharge was present [Scant] : scant [White] : white [No Bleeding] : There was no active vaginal bleeding [Normal] : normal [FreeTextEntry1] : NP student Angelia

## 2023-03-22 NOTE — PLAN
[FreeTextEntry1] : - Affirm sent to r/o yeast \par - will hold treating at this time as patients symptoms have cleared after Monistat use, will call pt if affirm swab is positive for yeast \par - educated on genital hygiene, using coconut oil for vaginal lubrication, wearing loose fitting clothing and cotton underwear as well as avoiding scented soaps\par - Pt comfortable with plan, return to office as needed or for any new/worsening symptoms

## 2023-03-25 LAB
CANDIDA VAG CYTO: NOT DETECTED
G VAGINALIS+PREV SP MTYP VAG QL MICRO: NOT DETECTED
T VAGINALIS VAG QL WET PREP: NOT DETECTED

## 2023-04-06 ENCOUNTER — APPOINTMENT (OUTPATIENT)
Dept: OBGYN | Facility: CLINIC | Age: 63
End: 2023-04-06

## 2023-10-30 NOTE — PHYSICAL EXAM
[facility]    Brief Operative Note    Pre-operative diagnosis: Endometrial cancer (H) [C54.1]  Post-operative diagnosis Same    Procedure: TOTAL LAPAROSCOPIC HYSTERECTOMY, WITH BILATERAL SALPINGO-OOPHORECTOMY, BILATEAL PELVIC SENTINEL LYMPH NODE, N/A - Abdomen    Surgeon: Surgeon(s) and Role:     * Kaykay Burden MD - Primary     * Elier Arauz MD - Resident - Assisting  Anesthesia: General   Estimated Blood Loss: 50 mL    Drains: [facility] itraop  Specimens:   ID Type Source Tests Collected by Time Destination   1 : Left pelvic sentinel lymph node Tissue Lymph Node(s) SURGICAL PATHOLOGY EXAM Kaykay Burden MD 10/30/2023  8:39 AM    2 : Right pelvic sentinel lymph node Tissue Lymph Node(s) SURGICAL PATHOLOGY EXAM Kaykay Burden MD 10/30/2023  9:00 AM    3 : Uterus, cervix, bilateral fallopian tubes, and bilateral ovaries Tissue Uterus, Cervix, Bilateral Fallopian Tubes & Ovaries SURGICAL PATHOLOGY EXAM Kaykay Burden MD 10/30/2023  9:29 AM      Findings:  EUA revealed small, anteverted, mobile uterus. No adnexal masses noted. External genitalia, vaginal vault and cervix appeared normal. On laparoscopy, normal appearing liver edge, gallbladder, omentum, bowel, appendix. Normal appearing uterus, bilateral fallopian tubes and ovaries. Filmy adhesions from sigmoid colon to pelvic sidewall. Appendix visualized. Small vaginal laceration at introitus at end of case. Made hemostatic with 2-0 Vicryl stitch. Hemostatic surgical sites at end of case.    Complications: None.  Implants: * No implants in log *        Elier Arauz MD  Obstetrics, Gynecology, and Women's Health  PGY4  10:11 AM 10/30/2023       [Labia Majora] : normal [Labia Minora] : normal [Normal] : normal [Uterine Adnexae] : normal [Appropriately responsive] : appropriately responsive [Alert] : alert [No Acute Distress] : no acute distress [Oriented x3] : oriented x3 [Atrophy] : atrophy [No Bleeding] : There was no active vaginal bleeding [FreeTextEntry1] : right thigh crease on inner thigh tender to palpation, no swelling erythema or drainage, groin appeared symmetrica bilaterally

## 2023-11-06 ENCOUNTER — APPOINTMENT (OUTPATIENT)
Dept: OBGYN | Facility: CLINIC | Age: 63
End: 2023-11-06
Payer: MEDICARE

## 2024-03-09 ENCOUNTER — APPOINTMENT (OUTPATIENT)
Dept: OBGYN | Facility: CLINIC | Age: 64
End: 2024-03-09
Payer: MEDICARE

## 2024-03-09 VITALS
HEIGHT: 63 IN | BODY MASS INDEX: 23.77 KG/M2 | DIASTOLIC BLOOD PRESSURE: 80 MMHG | SYSTOLIC BLOOD PRESSURE: 140 MMHG | WEIGHT: 134.13 LBS

## 2024-03-09 DIAGNOSIS — Z13.820 ENCOUNTER FOR SCREENING FOR OSTEOPOROSIS: ICD-10-CM

## 2024-03-09 DIAGNOSIS — Z12.4 ENCOUNTER FOR SCREENING FOR MALIGNANT NEOPLASM OF CERVIX: ICD-10-CM

## 2024-03-09 DIAGNOSIS — Z01.419 ENCOUNTER FOR GYNECOLOGICAL EXAMINATION (GENERAL) (ROUTINE) W/OUT ABNORMAL FINDINGS: ICD-10-CM

## 2024-03-09 DIAGNOSIS — Z12.31 ENCOUNTER FOR SCREENING MAMMOGRAM FOR MALIGNANT NEOPLASM OF BREAST: ICD-10-CM

## 2024-03-09 DIAGNOSIS — R92.30 DENSE BREASTS, UNSPECIFIED: ICD-10-CM

## 2024-03-09 PROCEDURE — 99397 PER PM REEVAL EST PAT 65+ YR: CPT

## 2024-03-09 RX ORDER — DIAZEPAM 5 MG/1
TABLET ORAL
Refills: 0 | Status: ACTIVE | COMMUNITY

## 2024-03-09 NOTE — HISTORY OF PRESENT ILLNESS
[LMP unknown] : LMP unknown [N] : Patient reports normal menses [postmenopausal] : postmenopausal [Y] : Patient uses contraception [unknown] : Patient is unsure of the date of her LMP [No] : Patient does not have concerns regarding sex [Menarche Age: ____] : age at menarche was [unfilled] [TextBox_4] : Esperanza is here for her annual exam. She denies gyn complaints at this time. Last year was a difficult one for her family. Her daughter in law  in october (leukemia, after many years of illness and surgeries following a horrific car accident 7 years prior) [TextBox_19] : BR1 [Mammogramdate] : 12/7/22 [TextBox_25] : BR1 [BreastSonogramDate] : 12/7/22 [PapSmeardate] : 11/01/22 [TextBox_31] : NEG [HPVDate] : 11/01/22 [TextBox_78] : NEG [PGxTotal] : 6 [[facility] 4 [[facility] : 4 [PGHxABInduced] : 2

## 2024-03-13 LAB
CYTOLOGY CVX/VAG DOC THIN PREP: ABNORMAL
HPV HIGH+LOW RISK DNA PNL CVX: NOT DETECTED

## 2024-04-29 ENCOUNTER — RESULT REVIEW (OUTPATIENT)
Age: 64
End: 2024-04-29

## 2024-04-29 ENCOUNTER — APPOINTMENT (OUTPATIENT)
Dept: RADIOLOGY | Facility: CLINIC | Age: 64
End: 2024-04-29
Payer: MEDICARE

## 2024-04-29 ENCOUNTER — APPOINTMENT (OUTPATIENT)
Dept: MAMMOGRAPHY | Facility: CLINIC | Age: 64
End: 2024-04-29
Payer: MEDICARE

## 2024-04-29 ENCOUNTER — OUTPATIENT (OUTPATIENT)
Dept: OUTPATIENT SERVICES | Facility: HOSPITAL | Age: 64
LOS: 1 days | End: 2024-04-29
Payer: MEDICARE

## 2024-04-29 ENCOUNTER — APPOINTMENT (OUTPATIENT)
Dept: ULTRASOUND IMAGING | Facility: CLINIC | Age: 64
End: 2024-04-29
Payer: MEDICARE

## 2024-04-29 DIAGNOSIS — Z98.89 OTHER SPECIFIED POSTPROCEDURAL STATES: Chronic | ICD-10-CM

## 2024-04-29 DIAGNOSIS — Z13.820 ENCOUNTER FOR SCREENING FOR OSTEOPOROSIS: ICD-10-CM

## 2024-04-29 DIAGNOSIS — Z12.31 ENCOUNTER FOR SCREENING MAMMOGRAM FOR MALIGNANT NEOPLASM OF BREAST: ICD-10-CM

## 2024-04-29 DIAGNOSIS — Z98.890 OTHER SPECIFIED POSTPROCEDURAL STATES: Chronic | ICD-10-CM

## 2024-04-29 PROCEDURE — 77067 SCR MAMMO BI INCL CAD: CPT | Mod: 26

## 2024-04-29 PROCEDURE — 77085 DXA BONE DENSITY AXL VRT FX: CPT | Mod: 26

## 2024-04-29 PROCEDURE — 77085 DXA BONE DENSITY AXL VRT FX: CPT

## 2024-04-29 PROCEDURE — 76641 ULTRASOUND BREAST COMPLETE: CPT | Mod: 26,50

## 2024-04-29 PROCEDURE — 77063 BREAST TOMOSYNTHESIS BI: CPT | Mod: 26

## 2024-04-29 PROCEDURE — 77067 SCR MAMMO BI INCL CAD: CPT

## 2024-04-29 PROCEDURE — 76641 ULTRASOUND BREAST COMPLETE: CPT

## 2024-04-29 PROCEDURE — 77063 BREAST TOMOSYNTHESIS BI: CPT

## 2024-05-01 ENCOUNTER — NON-APPOINTMENT (OUTPATIENT)
Age: 64
End: 2024-05-01

## 2024-06-21 ENCOUNTER — NON-APPOINTMENT (OUTPATIENT)
Age: 64
End: 2024-06-21

## 2024-10-12 ENCOUNTER — OFFICE (OUTPATIENT)
Dept: URBAN - METROPOLITAN AREA CLINIC 12 | Facility: CLINIC | Age: 64
Setting detail: OPHTHALMOLOGY
End: 2024-10-12
Payer: MEDICARE

## 2024-10-12 VITALS — HEIGHT: 55 IN

## 2024-10-12 DIAGNOSIS — H16.223: ICD-10-CM

## 2024-10-12 PROBLEM — H43.813 POSTERIOR VITREOUS DETACHMENT ; BOTH EYES: Status: ACTIVE | Noted: 2024-10-12

## 2024-10-12 PROBLEM — Z96.1 PSEUDOPHAKIA ; BOTH EYES: Status: ACTIVE | Noted: 2024-10-12

## 2024-10-12 PROCEDURE — 99203 OFFICE O/P NEW LOW 30 MIN: CPT | Performed by: OPHTHALMOLOGY

## 2024-10-12 ASSESSMENT — KERATOMETRY
OS_AXISANGLE_DEGREES: 168
OS_K2POWER_DIOPTERS: 47.75
METHOD_AUTO_MANUAL: AUTO
OD_K1POWER_DIOPTERS: 45.50
OD_K2POWER_DIOPTERS: 46.00
OS_K1POWER_DIOPTERS: 45.75
OD_AXISANGLE_DEGREES: 014

## 2024-10-12 ASSESSMENT — VISUAL ACUITY
OS_BCVA: 20/20-1
OD_BCVA: 20/30

## 2024-10-12 ASSESSMENT — DECREASING TEAR LAKE - SEVERITY SCORE
OS_DEC_TEARLAKE: 1+
OD_DEC_TEARLAKE: 1+

## 2024-10-12 ASSESSMENT — REFRACTION_AUTOREFRACTION
OS_CYLINDER: -2.50
OD_CYLINDER: -1.00
OD_SPHERE: +1.75
OS_SPHERE: +2.75
OS_AXIS: 082
OD_AXIS: 108

## 2024-10-12 ASSESSMENT — REFRACTION_MANIFEST
OD_VA1: 20/20
OS_SPHERE: +1.50
OD_SPHERE: +1.00
OD_AXIS: 10
OS_AXIS: 085
OS_CYLINDER: -0.75
OS_ADD: +2.50
OU_VA: 20/20
OD_CYLINDER: -0.50
OD_ADD: +2.50
OS_VA1: 20/25

## 2024-10-12 ASSESSMENT — REFRACTION_CURRENTRX
OS_AXIS: 065
OD_OVR_VA: 20/
OS_CYLINDER: -0.75
OD_VPRISM_DIRECTION: SV
OS_OVR_VA: 20/
OS_SPHERE: +2.75
OD_SPHERE: +2.50
OD_CYLINDER: SPHERE
OS_VPRISM_DIRECTION: SV

## 2024-10-12 ASSESSMENT — CONFRONTATIONAL VISUAL FIELD TEST (CVF)
OS_FINDINGS: FULL
OD_FINDINGS: FULL

## 2024-10-12 ASSESSMENT — LID EXAM ASSESSMENTS
OD_BLEPHARITIS: RUL 1+
OS_BLEPHARITIS: LUL 1+

## 2024-11-09 ENCOUNTER — OFFICE (OUTPATIENT)
Dept: URBAN - METROPOLITAN AREA CLINIC 12 | Facility: CLINIC | Age: 64
Setting detail: OPHTHALMOLOGY
End: 2024-11-09
Payer: MEDICARE

## 2024-11-09 ENCOUNTER — RX ONLY (RX ONLY)
Age: 64
End: 2024-11-09

## 2024-11-09 DIAGNOSIS — H16.223: ICD-10-CM

## 2024-11-09 PROBLEM — H52.7 REFRACTIVE ERROR: Status: ACTIVE | Noted: 2024-11-09

## 2024-11-09 PROCEDURE — 99213 OFFICE O/P EST LOW 20 MIN: CPT | Performed by: OPHTHALMOLOGY

## 2024-11-09 ASSESSMENT — VISUAL ACUITY
OS_BCVA: 20/20
OD_BCVA: 20/30+2

## 2024-11-09 ASSESSMENT — CONFRONTATIONAL VISUAL FIELD TEST (CVF)
OS_FINDINGS: FULL
OD_FINDINGS: FULL

## 2024-11-09 ASSESSMENT — REFRACTION_CURRENTRX
OD_OVR_VA: 20/
OS_AXIS: 065
OS_VPRISM_DIRECTION: SV
OD_CYLINDER: SPHERE
OS_OVR_VA: 20/
OD_SPHERE: +2.50
OS_CYLINDER: -0.75
OD_VPRISM_DIRECTION: SV
OS_SPHERE: +2.75

## 2024-11-09 ASSESSMENT — LID EXAM ASSESSMENTS
OS_BLEPHARITIS: LUL 1+
OD_BLEPHARITIS: RUL 1+

## 2024-11-09 ASSESSMENT — REFRACTION_AUTOREFRACTION
OS_AXIS: 084
OD_SPHERE: +1.75
OS_SPHERE: +2.75
OS_CYLINDER: -2.50
OD_AXIS: 098
OD_CYLINDER: -1.00

## 2024-11-09 ASSESSMENT — KERATOMETRY
OS_K2POWER_DIOPTERS: 47.50
OD_K2POWER_DIOPTERS: 45.75
OD_AXISANGLE_DEGREES: 034
METHOD_AUTO_MANUAL: AUTO
OS_AXISANGLE_DEGREES: 162
OS_K1POWER_DIOPTERS: 45.25
OD_K1POWER_DIOPTERS: 45.50

## 2024-11-09 ASSESSMENT — REFRACTION_MANIFEST
OS_SPHERE: +1.50
OS_ADD: +2.50
OS_AXIS: 085
OD_AXIS: 10
OD_VA1: 20/20
OS_CYLINDER: -0.75
OD_ADD: +2.50
OD_SPHERE: +1.00
OD_CYLINDER: -0.50
OU_VA: 20/20
OS_VA1: 20/25

## 2024-11-09 ASSESSMENT — DECREASING TEAR LAKE - SEVERITY SCORE
OS_DEC_TEARLAKE: 1+
OD_DEC_TEARLAKE: 1+

## 2024-11-09 ASSESSMENT — TONOMETRY: OD_IOP_MMHG: 10

## 2024-12-12 ENCOUNTER — OFFICE (OUTPATIENT)
Dept: URBAN - METROPOLITAN AREA CLINIC 12 | Facility: CLINIC | Age: 64
Setting detail: OPHTHALMOLOGY
End: 2024-12-12
Payer: MEDICARE

## 2024-12-12 VITALS — HEIGHT: 55 IN

## 2024-12-12 DIAGNOSIS — H16.223: ICD-10-CM

## 2024-12-12 DIAGNOSIS — H35.363: ICD-10-CM

## 2024-12-12 DIAGNOSIS — H31.012: ICD-10-CM

## 2024-12-12 DIAGNOSIS — H43.813: ICD-10-CM

## 2024-12-12 DIAGNOSIS — H53.9: ICD-10-CM

## 2024-12-12 DIAGNOSIS — H17.9: ICD-10-CM

## 2024-12-12 PROCEDURE — 92014 COMPRE OPH EXAM EST PT 1/>: CPT | Performed by: OPHTHALMOLOGY

## 2024-12-12 PROCEDURE — 92250 FUNDUS PHOTOGRAPHY W/I&R: CPT | Performed by: OPHTHALMOLOGY

## 2024-12-12 ASSESSMENT — CONFRONTATIONAL VISUAL FIELD TEST (CVF)
OS_FINDINGS: FULL
OD_FINDINGS: FULL

## 2024-12-12 ASSESSMENT — REFRACTION_AUTOREFRACTION
OS_CYLINDER: -2.25
OD_CYLINDER: -1.00
OS_AXIS: 083
OS_SPHERE: +2.50
OD_AXIS: 104
OD_SPHERE: +1.75

## 2024-12-12 ASSESSMENT — VISUAL ACUITY
OD_BCVA: 20/30+2
OS_BCVA: 20/25-2

## 2024-12-12 ASSESSMENT — REFRACTION_MANIFEST
OD_VA1: 20/20
OS_ADD: +2.50
OU_VA: 20/20
OD_CYLINDER: -0.50
OS_VA1: 20/25
OD_AXIS: 10
OD_SPHERE: +1.00
OS_CYLINDER: -0.75
OS_SPHERE: +1.50
OS_AXIS: 085
OD_ADD: +2.50

## 2024-12-12 ASSESSMENT — REFRACTION_CURRENTRX
OD_VPRISM_DIRECTION: SV
OD_CYLINDER: SPHERE
OS_CYLINDER: -0.75
OS_OVR_VA: 20/
OS_SPHERE: +2.75
OD_SPHERE: +2.50
OS_AXIS: 065
OS_VPRISM_DIRECTION: SV
OD_OVR_VA: 20/

## 2024-12-12 ASSESSMENT — KERATOMETRY
METHOD_AUTO_MANUAL: AUTO
OS_K2POWER_DIOPTERS: 47.75
OD_K2POWER_DIOPTERS: 46.00
OD_AXISANGLE_DEGREES: 034
OD_K1POWER_DIOPTERS: 45.50
OS_K1POWER_DIOPTERS: 45.75
OS_AXISANGLE_DEGREES: 162

## 2024-12-12 ASSESSMENT — LID EXAM ASSESSMENTS
OS_BLEPHARITIS: LUL 1+
OD_BLEPHARITIS: RUL 1+

## 2024-12-12 ASSESSMENT — TONOMETRY
OS_IOP_MMHG: 11
OD_IOP_MMHG: 10

## 2024-12-12 ASSESSMENT — DECREASING TEAR LAKE - SEVERITY SCORE
OS_DEC_TEARLAKE: 1+
OD_DEC_TEARLAKE: 1+

## 2024-12-14 ENCOUNTER — OFFICE (OUTPATIENT)
Dept: URBAN - METROPOLITAN AREA CLINIC 88 | Facility: CLINIC | Age: 64
Setting detail: OPHTHALMOLOGY
End: 2024-12-14
Payer: MEDICARE

## 2024-12-14 DIAGNOSIS — H43.813: ICD-10-CM

## 2024-12-14 DIAGNOSIS — H35.363: ICD-10-CM

## 2024-12-14 PROCEDURE — 92014 COMPRE OPH EXAM EST PT 1/>: CPT | Performed by: OPHTHALMOLOGY

## 2024-12-14 PROCEDURE — 92134 CPTRZ OPH DX IMG PST SGM RTA: CPT | Performed by: OPHTHALMOLOGY

## 2024-12-14 ASSESSMENT — REFRACTION_AUTOREFRACTION
OD_AXIS: 104
OS_AXIS: 083
OD_CYLINDER: -1.00
OS_CYLINDER: -2.25
OS_SPHERE: +2.50
OD_SPHERE: +1.75

## 2024-12-14 ASSESSMENT — KERATOMETRY
OS_K1POWER_DIOPTERS: 45.75
OD_AXISANGLE_DEGREES: 034
OS_K2POWER_DIOPTERS: 47.75
OS_AXISANGLE_DEGREES: 162
METHOD_AUTO_MANUAL: AUTO
OD_K1POWER_DIOPTERS: 45.50
OD_K2POWER_DIOPTERS: 46.00

## 2024-12-14 ASSESSMENT — DECREASING TEAR LAKE - SEVERITY SCORE
OS_DEC_TEARLAKE: 1+
OD_DEC_TEARLAKE: 1+

## 2024-12-14 ASSESSMENT — CONFRONTATIONAL VISUAL FIELD TEST (CVF)
OD_FINDINGS: FULL
OS_FINDINGS: FULL

## 2024-12-14 ASSESSMENT — VISUAL ACUITY
OS_BCVA: 20/30
OD_BCVA: 20/30

## 2024-12-14 ASSESSMENT — LID EXAM ASSESSMENTS
OD_BLEPHARITIS: RUL 1+
OS_BLEPHARITIS: LUL 1+

## 2025-01-24 ENCOUNTER — OFFICE (OUTPATIENT)
Dept: URBAN - METROPOLITAN AREA CLINIC 88 | Facility: CLINIC | Age: 65
Setting detail: OPHTHALMOLOGY
End: 2025-01-24
Payer: MEDICARE

## 2025-01-24 DIAGNOSIS — H53.19: ICD-10-CM

## 2025-01-24 DIAGNOSIS — H35.363: ICD-10-CM

## 2025-01-24 DIAGNOSIS — H43.813: ICD-10-CM

## 2025-01-24 PROCEDURE — 92012 INTRM OPH EXAM EST PATIENT: CPT | Performed by: OPHTHALMOLOGY

## 2025-01-24 PROCEDURE — 92250 FUNDUS PHOTOGRAPHY W/I&R: CPT | Performed by: OPHTHALMOLOGY

## 2025-01-24 PROCEDURE — 92235 FLUORESCEIN ANGRPH MLTIFRAME: CPT | Performed by: OPHTHALMOLOGY

## 2025-01-24 ASSESSMENT — DECREASING TEAR LAKE - SEVERITY SCORE
OD_DEC_TEARLAKE: 1+
OS_DEC_TEARLAKE: 1+

## 2025-01-24 ASSESSMENT — KERATOMETRY
METHOD_AUTO_MANUAL: AUTO
OS_AXISANGLE_DEGREES: 162
OD_AXISANGLE_DEGREES: 034
OS_K1POWER_DIOPTERS: 45.75
OS_K2POWER_DIOPTERS: 47.75
OD_K1POWER_DIOPTERS: 45.50
OD_K2POWER_DIOPTERS: 46.00

## 2025-01-24 ASSESSMENT — REFRACTION_AUTOREFRACTION
OD_SPHERE: +1.75
OD_AXIS: 104
OS_SPHERE: +2.50
OD_CYLINDER: -1.00
OS_AXIS: 083
OS_CYLINDER: -2.25

## 2025-01-24 ASSESSMENT — LID EXAM ASSESSMENTS
OS_BLEPHARITIS: LUL 1+
OD_BLEPHARITIS: RUL 1+

## 2025-01-24 ASSESSMENT — TONOMETRY: OD_IOP_MMHG: 10

## 2025-01-24 ASSESSMENT — VISUAL ACUITY
OD_BCVA: 20/40-2
OS_BCVA: 20/40-1

## 2025-01-24 ASSESSMENT — CONFRONTATIONAL VISUAL FIELD TEST (CVF)
OS_FINDINGS: FULL
OD_FINDINGS: FULL

## 2025-02-24 ENCOUNTER — OFFICE (OUTPATIENT)
Dept: URBAN - METROPOLITAN AREA CLINIC 88 | Facility: CLINIC | Age: 65
Setting detail: OPHTHALMOLOGY
End: 2025-02-24
Payer: MEDICARE

## 2025-02-24 DIAGNOSIS — H53.19: ICD-10-CM

## 2025-02-24 DIAGNOSIS — H35.363: ICD-10-CM

## 2025-02-24 DIAGNOSIS — H43.813: ICD-10-CM

## 2025-02-24 PROCEDURE — 92012 INTRM OPH EXAM EST PATIENT: CPT | Performed by: OPHTHALMOLOGY

## 2025-02-24 PROCEDURE — 92134 CPTRZ OPH DX IMG PST SGM RTA: CPT | Performed by: OPHTHALMOLOGY

## 2025-02-24 ASSESSMENT — DECREASING TEAR LAKE - SEVERITY SCORE
OS_DEC_TEARLAKE: 1+
OD_DEC_TEARLAKE: 1+

## 2025-02-24 ASSESSMENT — KERATOMETRY
OD_AXISANGLE_DEGREES: 034
OD_K2POWER_DIOPTERS: 46.00
OS_AXISANGLE_DEGREES: 162
OS_K1POWER_DIOPTERS: 45.75
METHOD_AUTO_MANUAL: AUTO
OS_K2POWER_DIOPTERS: 47.75
OD_K1POWER_DIOPTERS: 45.50

## 2025-02-24 ASSESSMENT — VISUAL ACUITY
OD_BCVA: 20/25
OS_BCVA: 20/30

## 2025-02-24 ASSESSMENT — CONFRONTATIONAL VISUAL FIELD TEST (CVF)
OS_FINDINGS: FULL
OD_FINDINGS: FULL

## 2025-02-24 ASSESSMENT — REFRACTION_AUTOREFRACTION
OS_SPHERE: +2.50
OD_CYLINDER: -1.00
OS_AXIS: 083
OS_CYLINDER: -2.25
OD_AXIS: 104
OD_SPHERE: +1.75

## 2025-02-24 ASSESSMENT — TONOMETRY: OD_IOP_MMHG: 15

## 2025-02-24 ASSESSMENT — LID EXAM ASSESSMENTS
OS_BLEPHARITIS: LUL 1+
OD_BLEPHARITIS: RUL 1+

## 2025-03-29 ENCOUNTER — APPOINTMENT (OUTPATIENT)
Dept: OBGYN | Facility: CLINIC | Age: 65
End: 2025-03-29

## 2025-03-29 ENCOUNTER — NON-APPOINTMENT (OUTPATIENT)
Age: 65
End: 2025-03-29

## 2025-03-29 VITALS
WEIGHT: 133 LBS | SYSTOLIC BLOOD PRESSURE: 126 MMHG | DIASTOLIC BLOOD PRESSURE: 80 MMHG | BODY MASS INDEX: 23.57 KG/M2 | HEIGHT: 63 IN

## 2025-03-29 DIAGNOSIS — R92.30 DENSE BREASTS, UNSPECIFIED: ICD-10-CM

## 2025-03-29 DIAGNOSIS — Z12.31 ENCOUNTER FOR SCREENING MAMMOGRAM FOR MALIGNANT NEOPLASM OF BREAST: ICD-10-CM

## 2025-03-29 DIAGNOSIS — N39.3 STRESS INCONTINENCE (FEMALE) (MALE): ICD-10-CM

## 2025-03-29 DIAGNOSIS — Z80.0 FAMILY HISTORY OF MALIGNANT NEOPLASM OF DIGESTIVE ORGANS: ICD-10-CM

## 2025-03-29 PROCEDURE — 99397 PER PM REEVAL EST PAT 65+ YR: CPT

## 2025-03-29 PROCEDURE — 99459 PELVIC EXAMINATION: CPT

## 2025-03-29 RX ORDER — LORATADINE 10 MG
CAPSULE ORAL
Refills: 0 | Status: ACTIVE | COMMUNITY

## 2025-04-14 ENCOUNTER — APPOINTMENT (OUTPATIENT)
Dept: ULTRASOUND IMAGING | Facility: CLINIC | Age: 65
End: 2025-04-14
Payer: MEDICARE

## 2025-04-14 ENCOUNTER — RESULT REVIEW (OUTPATIENT)
Age: 65
End: 2025-04-14

## 2025-04-14 ENCOUNTER — APPOINTMENT (OUTPATIENT)
Dept: MAMMOGRAPHY | Facility: CLINIC | Age: 65
End: 2025-04-14
Payer: MEDICARE

## 2025-04-14 ENCOUNTER — OUTPATIENT (OUTPATIENT)
Dept: OUTPATIENT SERVICES | Facility: HOSPITAL | Age: 65
LOS: 1 days | End: 2025-04-14
Payer: MEDICARE

## 2025-04-14 DIAGNOSIS — Z98.890 OTHER SPECIFIED POSTPROCEDURAL STATES: Chronic | ICD-10-CM

## 2025-04-14 DIAGNOSIS — Z00.8 ENCOUNTER FOR OTHER GENERAL EXAMINATION: ICD-10-CM

## 2025-04-14 DIAGNOSIS — Z12.31 ENCOUNTER FOR SCREENING MAMMOGRAM FOR MALIGNANT NEOPLASM OF BREAST: ICD-10-CM

## 2025-04-14 DIAGNOSIS — Z98.89 OTHER SPECIFIED POSTPROCEDURAL STATES: Chronic | ICD-10-CM

## 2025-04-14 DIAGNOSIS — R92.30 DENSE BREASTS, UNSPECIFIED: ICD-10-CM

## 2025-04-14 PROCEDURE — 77067 SCR MAMMO BI INCL CAD: CPT | Mod: 26

## 2025-04-14 PROCEDURE — 77063 BREAST TOMOSYNTHESIS BI: CPT

## 2025-04-14 PROCEDURE — 76641 ULTRASOUND BREAST COMPLETE: CPT

## 2025-04-14 PROCEDURE — 77067 SCR MAMMO BI INCL CAD: CPT

## 2025-04-14 PROCEDURE — 76641 ULTRASOUND BREAST COMPLETE: CPT | Mod: 26,50

## 2025-04-14 PROCEDURE — 77063 BREAST TOMOSYNTHESIS BI: CPT | Mod: 26

## 2025-05-09 PROBLEM — Z01.419 WELL FEMALE EXAM WITH ROUTINE GYNECOLOGICAL EXAM: Status: ACTIVE | Noted: 2025-05-09

## 2025-06-11 ENCOUNTER — APPOINTMENT (OUTPATIENT)
Dept: COLORECTAL SURGERY | Facility: CLINIC | Age: 65
End: 2025-06-11
Payer: MEDICARE

## 2025-06-11 PROBLEM — Z12.11 COLON CANCER SCREENING: Status: ACTIVE | Noted: 2019-12-17

## 2025-06-11 PROCEDURE — 99203 OFFICE O/P NEW LOW 30 MIN: CPT | Mod: 93

## 2025-08-12 ENCOUNTER — OUTPATIENT (OUTPATIENT)
Dept: OUTPATIENT SERVICES | Facility: HOSPITAL | Age: 65
LOS: 1 days | Discharge: HOME | End: 2025-08-12
Payer: MEDICARE

## 2025-08-12 ENCOUNTER — APPOINTMENT (OUTPATIENT)
Dept: COLORECTAL SURGERY | Facility: HOSPITAL | Age: 65
End: 2025-08-12

## 2025-08-12 ENCOUNTER — RESULT REVIEW (OUTPATIENT)
Age: 65
End: 2025-08-12

## 2025-08-12 VITALS
WEIGHT: 132.94 LBS | HEART RATE: 76 BPM | DIASTOLIC BLOOD PRESSURE: 88 MMHG | TEMPERATURE: 97 F | HEIGHT: 64 IN | OXYGEN SATURATION: 100 % | RESPIRATION RATE: 20 BRPM | SYSTOLIC BLOOD PRESSURE: 143 MMHG

## 2025-08-12 DIAGNOSIS — Z98.89 OTHER SPECIFIED POSTPROCEDURAL STATES: Chronic | ICD-10-CM

## 2025-08-12 DIAGNOSIS — Z12.11 ENCOUNTER FOR SCREENING FOR MALIGNANT NEOPLASM OF COLON: ICD-10-CM

## 2025-08-12 DIAGNOSIS — Z98.890 OTHER SPECIFIED POSTPROCEDURAL STATES: Chronic | ICD-10-CM

## 2025-08-12 PROCEDURE — 45378 DIAGNOSTIC COLONOSCOPY: CPT

## 2025-08-12 PROCEDURE — 88305 TISSUE EXAM BY PATHOLOGIST: CPT | Mod: 26

## 2025-08-12 PROCEDURE — 88305 TISSUE EXAM BY PATHOLOGIST: CPT

## 2025-08-12 PROCEDURE — 88342 IMHCHEM/IMCYTCHM 1ST ANTB: CPT

## 2025-08-12 PROCEDURE — 88342 IMHCHEM/IMCYTCHM 1ST ANTB: CPT | Mod: 26

## 2025-08-12 RX ORDER — LORATADINE 5 MG/5ML
0 SOLUTION ORAL
Refills: 0 | DISCHARGE

## 2025-08-12 RX ORDER — DIAZEPAM 5 MG/1
0 TABLET ORAL
Refills: 0 | DISCHARGE

## 2025-08-18 LAB — SURGICAL PATHOLOGY STUDY: SIGNIFICANT CHANGE UP

## 2025-08-20 DIAGNOSIS — K63.5 POLYP OF COLON: ICD-10-CM

## 2025-08-20 DIAGNOSIS — Z12.11 ENCOUNTER FOR SCREENING FOR MALIGNANT NEOPLASM OF COLON: ICD-10-CM

## 2025-08-20 DIAGNOSIS — K57.30 DIVERTICULOSIS OF LARGE INTESTINE WITHOUT PERFORATION OR ABSCESS WITHOUT BLEEDING: ICD-10-CM

## 2025-08-20 DIAGNOSIS — J45.909 UNSPECIFIED ASTHMA, UNCOMPLICATED: ICD-10-CM

## 2025-08-20 DIAGNOSIS — K62.82 DYSPLASIA OF ANUS: ICD-10-CM

## 2025-08-25 ENCOUNTER — APPOINTMENT (OUTPATIENT)
Dept: COLORECTAL SURGERY | Facility: CLINIC | Age: 65
End: 2025-08-25
Payer: MEDICARE

## 2025-08-25 VITALS
RESPIRATION RATE: 14 BRPM | WEIGHT: 133 LBS | BODY MASS INDEX: 23.57 KG/M2 | SYSTOLIC BLOOD PRESSURE: 165 MMHG | DIASTOLIC BLOOD PRESSURE: 95 MMHG | HEART RATE: 64 BPM | OXYGEN SATURATION: 98 % | HEIGHT: 63 IN

## 2025-08-25 DIAGNOSIS — K62.82 DYSPLASIA OF ANUS: ICD-10-CM

## 2025-08-25 PROCEDURE — 99213 OFFICE O/P EST LOW 20 MIN: CPT
